# Patient Record
Sex: MALE | Race: WHITE | Employment: UNEMPLOYED | URBAN - METROPOLITAN AREA
[De-identification: names, ages, dates, MRNs, and addresses within clinical notes are randomized per-mention and may not be internally consistent; named-entity substitution may affect disease eponyms.]

---

## 2019-06-20 ENCOUNTER — APPOINTMENT (OUTPATIENT)
Dept: GENERAL RADIOLOGY | Age: 49
DRG: 279 | End: 2019-06-20
Payer: COMMERCIAL

## 2019-06-20 ENCOUNTER — HOSPITAL ENCOUNTER (INPATIENT)
Age: 49
LOS: 6 days | Discharge: HOME | DRG: 279 | End: 2019-06-26
Attending: EMERGENCY MEDICINE | Admitting: INTERNAL MEDICINE
Payer: COMMERCIAL

## 2019-06-20 ENCOUNTER — APPOINTMENT (OUTPATIENT)
Dept: CT IMAGING | Age: 49
DRG: 279 | End: 2019-06-20
Payer: COMMERCIAL

## 2019-06-20 DIAGNOSIS — K72.00 ACUTE LIVER FAILURE WITHOUT HEPATIC COMA: Primary | ICD-10-CM

## 2019-06-20 PROBLEM — B19.9 ACUTE LIVER FAILURE DUE TO HEPATITIS VIRUS: Status: ACTIVE | Noted: 2019-06-20

## 2019-06-20 LAB
A/G RATIO: 1.2 (ref 1.1–2.2)
ALBUMIN SERPL-MCNC: 3.9 G/DL (ref 3.4–5)
ALP BLD-CCNC: 199 U/L (ref 40–129)
ALT SERPL-CCNC: 3739 U/L (ref 10–40)
AMMONIA: 61 UMOL/L (ref 16–60)
AMPHETAMINE SCREEN, URINE: NORMAL
ANION GAP SERPL CALCULATED.3IONS-SCNC: 11 MMOL/L (ref 3–16)
AST SERPL-CCNC: 3339 U/L (ref 15–37)
BARBITURATE SCREEN URINE: NORMAL
BASOPHILS ABSOLUTE: 0 K/UL (ref 0–0.2)
BASOPHILS RELATIVE PERCENT: 0 %
BENZODIAZEPINE SCREEN, URINE: NORMAL
BILIRUB SERPL-MCNC: 11.2 MG/DL (ref 0–1)
BILIRUBIN URINE: ABNORMAL
BLOOD, URINE: NEGATIVE
BUN BLDV-MCNC: 16 MG/DL (ref 7–20)
CALCIUM SERPL-MCNC: 9.4 MG/DL (ref 8.3–10.6)
CANNABINOID SCREEN URINE: NORMAL
CHLORIDE BLD-SCNC: 98 MMOL/L (ref 99–110)
CLARITY: CLEAR
CO2: 27 MMOL/L (ref 21–32)
COCAINE METABOLITE SCREEN URINE: NORMAL
COLOR: YELLOW
CREAT SERPL-MCNC: 0.6 MG/DL (ref 0.9–1.3)
EOSINOPHILS ABSOLUTE: 0.1 K/UL (ref 0–0.6)
EOSINOPHILS RELATIVE PERCENT: 1 %
ETHANOL: NORMAL MG/DL (ref 0–0.08)
GFR AFRICAN AMERICAN: >60
GFR NON-AFRICAN AMERICAN: >60
GLOBULIN: 3.2 G/DL
GLUCOSE BLD-MCNC: 108 MG/DL (ref 70–99)
GLUCOSE URINE: NEGATIVE MG/DL
HCT VFR BLD CALC: 46.6 % (ref 40.5–52.5)
HEMATOLOGY PATH CONSULT: YES
HEMOGLOBIN: 16.2 G/DL (ref 13.5–17.5)
INR BLD: 1.37 (ref 0.86–1.14)
KETONES, URINE: NEGATIVE MG/DL
LEUKOCYTE ESTERASE, URINE: NEGATIVE
LIPASE: 30 U/L (ref 13–60)
LYMPHOCYTES ABSOLUTE: 2.3 K/UL (ref 1–5.1)
LYMPHOCYTES RELATIVE PERCENT: 24 %
Lab: NORMAL
MAGNESIUM: 2.1 MG/DL (ref 1.8–2.4)
MCH RBC QN AUTO: 31.3 PG (ref 26–34)
MCHC RBC AUTO-ENTMCNC: 34.7 G/DL (ref 31–36)
MCV RBC AUTO: 90.1 FL (ref 80–100)
METHADONE SCREEN, URINE: NORMAL
MICROSCOPIC EXAMINATION: ABNORMAL
MONOCYTES ABSOLUTE: 1.6 K/UL (ref 0–1.3)
MONOCYTES RELATIVE PERCENT: 16 %
NEUTROPHILS ABSOLUTE: 5.7 K/UL (ref 1.7–7.7)
NEUTROPHILS RELATIVE PERCENT: 59 %
NITRITE, URINE: NEGATIVE
OPIATE SCREEN URINE: NORMAL
OXYCODONE URINE: NORMAL
PDW BLD-RTO: 15.7 % (ref 12.4–15.4)
PH UA: 6.5
PH UA: 6.5 (ref 5–8)
PHENCYCLIDINE SCREEN URINE: NORMAL
PLATELET # BLD: 437 K/UL (ref 135–450)
PLATELET SLIDE REVIEW: ADEQUATE
PMV BLD AUTO: 7.9 FL (ref 5–10.5)
POTASSIUM SERPL-SCNC: 4.3 MMOL/L (ref 3.5–5.1)
PROPOXYPHENE SCREEN: NORMAL
PROTEIN UA: NEGATIVE MG/DL
PROTHROMBIN TIME: 15.5 SEC (ref 9.8–13)
RBC # BLD: 5.18 M/UL (ref 4.2–5.9)
SLIDE REVIEW: ABNORMAL
SODIUM BLD-SCNC: 136 MMOL/L (ref 136–145)
SPECIFIC GRAVITY UA: <=1.005 (ref 1–1.03)
TOTAL PROTEIN: 7.1 G/DL (ref 6.4–8.2)
URINE TYPE: ABNORMAL
UROBILINOGEN, URINE: 0.2 E.U./DL
WBC # BLD: 9.7 K/UL (ref 4–11)

## 2019-06-20 PROCEDURE — 80053 COMPREHEN METABOLIC PANEL: CPT

## 2019-06-20 PROCEDURE — 1200000000 HC SEMI PRIVATE

## 2019-06-20 PROCEDURE — 6360000004 HC RX CONTRAST MEDICATION: Performed by: EMERGENCY MEDICINE

## 2019-06-20 PROCEDURE — 85610 PROTHROMBIN TIME: CPT

## 2019-06-20 PROCEDURE — G0480 DRUG TEST DEF 1-7 CLASSES: HCPCS

## 2019-06-20 PROCEDURE — 83735 ASSAY OF MAGNESIUM: CPT

## 2019-06-20 PROCEDURE — 82140 ASSAY OF AMMONIA: CPT

## 2019-06-20 PROCEDURE — 71045 X-RAY EXAM CHEST 1 VIEW: CPT

## 2019-06-20 PROCEDURE — 36415 COLL VENOUS BLD VENIPUNCTURE: CPT

## 2019-06-20 PROCEDURE — 80074 ACUTE HEPATITIS PANEL: CPT

## 2019-06-20 PROCEDURE — 80307 DRUG TEST PRSMV CHEM ANLYZR: CPT

## 2019-06-20 PROCEDURE — 87341 HEP B SURFACE AG NEUTRLZJ IA: CPT

## 2019-06-20 PROCEDURE — 2580000003 HC RX 258: Performed by: EMERGENCY MEDICINE

## 2019-06-20 PROCEDURE — 81003 URINALYSIS AUTO W/O SCOPE: CPT

## 2019-06-20 PROCEDURE — 83690 ASSAY OF LIPASE: CPT

## 2019-06-20 PROCEDURE — 99285 EMERGENCY DEPT VISIT HI MDM: CPT

## 2019-06-20 PROCEDURE — 74177 CT ABD & PELVIS W/CONTRAST: CPT

## 2019-06-20 PROCEDURE — 85025 COMPLETE CBC W/AUTO DIFF WBC: CPT

## 2019-06-20 RX ORDER — SODIUM CHLORIDE 9 MG/ML
INJECTION, SOLUTION INTRAVENOUS CONTINUOUS
Status: DISCONTINUED | OUTPATIENT
Start: 2019-06-21 | End: 2019-06-23

## 2019-06-20 RX ORDER — 0.9 % SODIUM CHLORIDE 0.9 %
1000 INTRAVENOUS SOLUTION INTRAVENOUS ONCE
Status: COMPLETED | OUTPATIENT
Start: 2019-06-20 | End: 2019-06-20

## 2019-06-20 RX ORDER — SODIUM CHLORIDE 0.9 % (FLUSH) 0.9 %
10 SYRINGE (ML) INJECTION PRN
Status: DISCONTINUED | OUTPATIENT
Start: 2019-06-20 | End: 2019-06-26 | Stop reason: HOSPADM

## 2019-06-20 RX ORDER — ONDANSETRON 2 MG/ML
4 INJECTION INTRAMUSCULAR; INTRAVENOUS EVERY 6 HOURS PRN
Status: DISCONTINUED | OUTPATIENT
Start: 2019-06-20 | End: 2019-06-26 | Stop reason: HOSPADM

## 2019-06-20 RX ORDER — IBUPROFEN 400 MG/1
400 TABLET ORAL EVERY 6 HOURS PRN
Status: DISCONTINUED | OUTPATIENT
Start: 2019-06-20 | End: 2019-06-21

## 2019-06-20 RX ORDER — SODIUM CHLORIDE 0.9 % (FLUSH) 0.9 %
10 SYRINGE (ML) INJECTION EVERY 12 HOURS SCHEDULED
Status: DISCONTINUED | OUTPATIENT
Start: 2019-06-21 | End: 2019-06-23

## 2019-06-20 RX ADMIN — IOPAMIDOL 75 ML: 755 INJECTION, SOLUTION INTRAVENOUS at 20:41

## 2019-06-20 RX ADMIN — SODIUM CHLORIDE 1000 ML: 900 INJECTION, SOLUTION INTRAVENOUS at 20:10

## 2019-06-20 ASSESSMENT — PAIN DESCRIPTION - ORIENTATION: ORIENTATION: MID;LOWER

## 2019-06-20 ASSESSMENT — PAIN DESCRIPTION - FREQUENCY: FREQUENCY: CONTINUOUS

## 2019-06-20 ASSESSMENT — PAIN DESCRIPTION - PAIN TYPE: TYPE: ACUTE PAIN

## 2019-06-20 ASSESSMENT — PAIN DESCRIPTION - DESCRIPTORS: DESCRIPTORS: SHARP

## 2019-06-20 ASSESSMENT — PAIN DESCRIPTION - LOCATION: LOCATION: ABDOMEN

## 2019-06-20 ASSESSMENT — PAIN SCALES - GENERAL: PAINLEVEL_OUTOF10: 8

## 2019-06-20 NOTE — ED PROVIDER NOTES
Spouse name: Not on file    Number of children: Not on file    Years of education: Not on file    Highest education level: Not on file   Occupational History    Not on file   Social Needs    Financial resource strain: Not on file    Food insecurity:     Worry: Not on file     Inability: Not on file    Transportation needs:     Medical: Not on file     Non-medical: Not on file   Tobacco Use    Smoking status: Current Every Day Smoker     Packs/day: 0.50     Types: Cigarettes    Smokeless tobacco: Never Used    Tobacco comment: pt states he cant smoke when his throat feels like this. Substance and Sexual Activity    Alcohol use: Yes     Comment: occ    Drug use: Yes     Types: Marijuana, Methamphetamines     Comment: states he last used \"a couple of months ago\"    Sexual activity: Yes     Partners: Female   Lifestyle    Physical activity:     Days per week: Not on file     Minutes per session: Not on file    Stress: Not on file   Relationships    Social connections:     Talks on phone: Not on file     Gets together: Not on file     Attends Restorationism service: Not on file     Active member of club or organization: Not on file     Attends meetings of clubs or organizations: Not on file     Relationship status: Not on file    Intimate partner violence:     Fear of current or ex partner: Not on file     Emotionally abused: Not on file     Physically abused: Not on file     Forced sexual activity: Not on file   Other Topics Concern    Not on file   Social History Narrative    Not on file       Nursing notes reviewed. ED Triage Vitals [06/20/19 1942]   Enc Vitals Group      BP (!) 134/117      Pulse 80      Resp 16      Temp 97.8 °F (36.6 °C)      Temp Source Oral      SpO2 95 %      Weight 200 lb (90.7 kg)      Height 5' 8\" (1.727 m)      Head Circumference       Peak Flow       Pain Score       Pain Loc       Pain Edu? Excl. in [address]? GENERAL:  Awake, alert.  Well developed, well nourished with no apparent distress. HENT:  Normocephalic, Atraumatic, moist mucous membranes. EYES:  Pupils equal round and reactive to light, Conjunctiva normal, extraocular movements normal. +Scleral icterus  NECK:  No meningeal signs, Supple. CHEST:  Regular rate and rhythm, chest wall non-tender. LUNGS:  Clear to auscultation bilaterally, no respiratory distress. ABDOMEN:  Soft, Tender to palpation in the left lower quadrant of the abdomen no rebound tenderness or guarding, non-distended, normal bowel sounds. No costovertebral angle tenderness to palpation. EXTREMITIES:  Normal range of motion, no edema, no tenderness, no deformity, distal pulses present. BACK:  No tenderness. SKIN: Warm, dry and intact. + Jaundice  NEUROLOGIC: Normal mental status. Moving all extremities to command. RADIOLOGY  X-RAYS:  I have reviewed radiologic plain film image(s). ALL OTHER NON-PLAIN FILM IMAGES SUCH AS CT, ULTRASOUND AND MRI HAVE BEEN READ BY THE RADIOLOGIST. CT ABDOMEN PELVIS W IV CONTRAST Additional Contrast? None   Final Result   Mottled appearance of the hepatic parenchyma, with periportal and   pericholecystic edema, along with edema extending into the nicholas hepatis. These are nonspecific findings, but given the patient's history of jaundice,   hepatitis is the primary consideration. Mild mural thickening of the 2nd portion the duodenum, which is likely   reactive. Increased number of shotty borderline enlarged retroperitoneal lymph nodes,   also probably reactive, but follow-up to resolution is recommended. Indeterminate low-density lesion within the posterior right kidney. After   resolution of acute symptoms, dedicated renal CT or MRI is recommended to   better evaluate that. XR CHEST PORTABLE   Final Result   No acute process.               LABS  Results for orders placed or performed during the hospital encounter of 06/20/19   CBC auto differential   Result Value Ref Range    WBC 9.7 4.0 - 11.0 K/uL    RBC 5.18 4.20 - 5.90 M/uL    Hemoglobin 16.2 13.5 - 17.5 g/dL    Hematocrit 46.6 40.5 - 52.5 %    MCV 90.1 80.0 - 100.0 fL    MCH 31.3 26.0 - 34.0 pg    MCHC 34.7 31.0 - 36.0 g/dL    RDW 15.7 (H) 12.4 - 15.4 %    Platelets 552 000 - 532 K/uL    MPV 7.9 5.0 - 10.5 fL    PLATELET SLIDE REVIEW Adequate     SLIDE REVIEW see below     Path Consult Yes     Neutrophils % 59.0 %    Lymphocytes % 24.0 %    Monocytes % 16.0 %    Eosinophils % 1.0 %    Basophils % 0.0 %    Neutrophils # 5.7 1.7 - 7.7 K/uL    Lymphocytes # 2.3 1.0 - 5.1 K/uL    Monocytes # 1.6 (H) 0.0 - 1.3 K/uL    Eosinophils # 0.1 0.0 - 0.6 K/uL    Basophils # 0.0 0.0 - 0.2 K/uL   Comprehensive Metabolic Panel   Result Value Ref Range    Sodium 136 136 - 145 mmol/L    Potassium 4.3 3.5 - 5.1 mmol/L    Chloride 98 (L) 99 - 110 mmol/L    CO2 27 21 - 32 mmol/L    Anion Gap 11 3 - 16    Glucose 108 (H) 70 - 99 mg/dL    BUN 16 7 - 20 mg/dL    CREATININE 0.6 (L) 0.9 - 1.3 mg/dL    GFR Non-African American >60 >60    GFR African American >60 >60    Calcium 9.4 8.3 - 10.6 mg/dL    Total Protein 7.1 6.4 - 8.2 g/dL    Alb 3.9 3.4 - 5.0 g/dL    Albumin/Globulin Ratio 1.2 1.1 - 2.2    Total Bilirubin 11.2 (H) 0.0 - 1.0 mg/dL    Alkaline Phosphatase 199 (H) 40 - 129 U/L    ALT 3,739 (H) 10 - 40 U/L    AST 3,339 (H) 15 - 37 U/L    Globulin 3.2 g/dL   Lipase   Result Value Ref Range    Lipase 30.0 13.0 - 60.0 U/L   Magnesium   Result Value Ref Range    Magnesium 2.10 1.80 - 2.40 mg/dL   Ethanol   Result Value Ref Range    Ethanol Lvl None Detected mg/dL   Ammonia   Result Value Ref Range    Ammonia 61 (H) 16 - 60 umol/L   Protime-INR   Result Value Ref Range    Protime 15.5 (H) 9.8 - 13.0 sec    INR 1.37 (H) 0.86 - 1.14         PROCEDURES      MEDICAL DECISION MAKING  On arrival to the emergency department, patient afebrile with otherwise normal vital signs.   No neurological deficits at present physical exam.  Patient with new jaundice and scleral icterus as well as abdominal pain in the left lower quadrant. CBC, CMP obtained demonstrating no leukocytosis but does demonstrate new transaminitis with AST and ALT above 3000. INR 1.37. EtOH level negative. Ammonia level 61. Lipase and magnesium level normal.  Chest x-ray obtained demonstrating no concerning acute cardiopulmonary abnormalities or opacifications concerning for pneumonia. CT scan of the abdomen pelvis obtained demonstrating moderate appearance of the hepatic parenchyma and periportal and pericholecystic edema which is likely secondary to hepatitis/new onset liver dysfunction. Mural thickening of the second portion of the duodenum which is likely reactive as well as enlarged retroperitoneal lymph nodes which are also likely reactive in nature. Patient also with a low-density lesion within the posterior right kidney. Patient will be admitted to the hospitalist service for further evaluation of current symptoms. Hepatitis panel pending. Patient is remained otherwise hemodynamically stable. Final diagnoses:   Acute liver failure without hepatic coma         Patient was given scripts for the following medications. I counseled patient how to take these medications. New Prescriptions    No medications on file       Disposition  Pt is in stable condition upon Admit to med/surg floor. This chart was generated using the [address] [place] [address] dictation system. I created this record but it may contain dictation errors. Mariel Crane MD  06/20/19 2857      Addendum, discussed case with Dr. Bebeto Hoover, gastroenterologist, who is amendable to keep patient in [facility] for further evaluation as opposed to transfer to another facility or liver center. Patient will be admitted to [facility] as previously planned.      Mariel Crane MD  06/20/19 6261

## 2019-06-21 PROBLEM — B16.9 ACUTE HEPATITIS B: Status: ACTIVE | Noted: 2019-06-21

## 2019-06-21 PROBLEM — F41.9 ANXIETY DISORDER: Status: ACTIVE | Noted: 2019-06-21

## 2019-06-21 PROBLEM — Z59.00 HOMELESS: Status: ACTIVE | Noted: 2019-06-21

## 2019-06-21 PROBLEM — F15.10 METHAMPHETAMINE ABUSE (HCC): Status: ACTIVE | Noted: 2019-06-21

## 2019-06-21 LAB
A/G RATIO: 1.1 (ref 1.1–2.2)
ALBUMIN SERPL-MCNC: 3.3 G/DL (ref 3.4–5)
ALP BLD-CCNC: 181 U/L (ref 40–129)
ALT SERPL-CCNC: 3444 U/L (ref 10–40)
ANION GAP SERPL CALCULATED.3IONS-SCNC: 10 MMOL/L (ref 3–16)
AST SERPL-CCNC: 2790 U/L (ref 15–37)
BASOPHILS ABSOLUTE: 0.1 K/UL (ref 0–0.2)
BASOPHILS RELATIVE PERCENT: 1.3 %
BILIRUB SERPL-MCNC: 9.5 MG/DL (ref 0–1)
BUN BLDV-MCNC: 13 MG/DL (ref 7–20)
CALCIUM SERPL-MCNC: 8.6 MG/DL (ref 8.3–10.6)
CHLORIDE BLD-SCNC: 97 MMOL/L (ref 99–110)
CO2: 23 MMOL/L (ref 21–32)
CREAT SERPL-MCNC: <0.5 MG/DL (ref 0.9–1.3)
EOSINOPHILS ABSOLUTE: 0.2 K/UL (ref 0–0.6)
EOSINOPHILS RELATIVE PERCENT: 2.5 %
GFR AFRICAN AMERICAN: >60
GFR NON-AFRICAN AMERICAN: >60
GLOBULIN: 3 G/DL
GLUCOSE BLD-MCNC: 116 MG/DL (ref 70–99)
HAV IGM SER IA-ACNC: ABNORMAL
HCT VFR BLD CALC: 42.6 % (ref 40.5–52.5)
HEMATOLOGY PATH CONSULT: NORMAL
HEMOGLOBIN: 14.7 G/DL (ref 13.5–17.5)
HEPATITIS B CORE IGM ANTIBODY: REACTIVE
HEPATITIS B SURFACE ANTIGEN INTERPRETATION: REACTIVE
HEPATITIS C ANTIBODY INTERPRETATION: ABNORMAL
INR BLD: 1.42 (ref 0.86–1.14)
LYMPHOCYTES ABSOLUTE: 2.3 K/UL (ref 1–5.1)
LYMPHOCYTES RELATIVE PERCENT: 24.9 %
MCH RBC QN AUTO: 31.6 PG (ref 26–34)
MCHC RBC AUTO-ENTMCNC: 34.6 G/DL (ref 31–36)
MCV RBC AUTO: 91.3 FL (ref 80–100)
MONOCYTES ABSOLUTE: 1.1 K/UL (ref 0–1.3)
MONOCYTES RELATIVE PERCENT: 12 %
NEUTROPHILS ABSOLUTE: 5.4 K/UL (ref 1.7–7.7)
NEUTROPHILS RELATIVE PERCENT: 59.3 %
PDW BLD-RTO: 16.4 % (ref 12.4–15.4)
PLATELET # BLD: 374 K/UL (ref 135–450)
PMV BLD AUTO: 9 FL (ref 5–10.5)
POTASSIUM REFLEX MAGNESIUM: 3.9 MMOL/L (ref 3.5–5.1)
PROTHROMBIN TIME: 16.2 SEC (ref 9.8–13)
RBC # BLD: 4.66 M/UL (ref 4.2–5.9)
SODIUM BLD-SCNC: 130 MMOL/L (ref 136–145)
TOTAL PROTEIN: 6.3 G/DL (ref 6.4–8.2)
WBC # BLD: 9.1 K/UL (ref 4–11)

## 2019-06-21 PROCEDURE — 36415 COLL VENOUS BLD VENIPUNCTURE: CPT

## 2019-06-21 PROCEDURE — 6360000002 HC RX W HCPCS: Performed by: INTERNAL MEDICINE

## 2019-06-21 PROCEDURE — 99233 SBSQ HOSP IP/OBS HIGH 50: CPT | Performed by: INTERNAL MEDICINE

## 2019-06-21 PROCEDURE — 85610 PROTHROMBIN TIME: CPT

## 2019-06-21 PROCEDURE — 1200000000 HC SEMI PRIVATE

## 2019-06-21 PROCEDURE — 6370000000 HC RX 637 (ALT 250 FOR IP): Performed by: INTERNAL MEDICINE

## 2019-06-21 PROCEDURE — 85025 COMPLETE CBC W/AUTO DIFF WBC: CPT

## 2019-06-21 PROCEDURE — 80053 COMPREHEN METABOLIC PANEL: CPT

## 2019-06-21 PROCEDURE — 6370000000 HC RX 637 (ALT 250 FOR IP): Performed by: PHYSICIAN ASSISTANT

## 2019-06-21 PROCEDURE — 2580000003 HC RX 258: Performed by: INTERNAL MEDICINE

## 2019-06-21 PROCEDURE — 94761 N-INVAS EAR/PLS OXIMETRY MLT: CPT

## 2019-06-21 RX ORDER — LORAZEPAM 0.5 MG/1
0.5 TABLET ORAL ONCE
Status: COMPLETED | OUTPATIENT
Start: 2019-06-21 | End: 2019-06-21

## 2019-06-21 RX ORDER — NICOTINE 21 MG/24HR
1 PATCH, TRANSDERMAL 24 HOURS TRANSDERMAL DAILY
Status: DISCONTINUED | OUTPATIENT
Start: 2019-06-21 | End: 2019-06-21

## 2019-06-21 RX ORDER — ZOLPIDEM TARTRATE 5 MG/1
5 TABLET ORAL NIGHTLY PRN
Status: DISCONTINUED | OUTPATIENT
Start: 2019-06-21 | End: 2019-06-26 | Stop reason: HOSPADM

## 2019-06-21 RX ORDER — PHYTONADIONE 10 MG/ML
10 INJECTION, EMULSION INTRAMUSCULAR; INTRAVENOUS; SUBCUTANEOUS ONCE
Status: COMPLETED | OUTPATIENT
Start: 2019-06-21 | End: 2019-06-21

## 2019-06-21 RX ORDER — OXYCODONE HYDROCHLORIDE 5 MG/1
5 TABLET ORAL EVERY 6 HOURS PRN
Status: DISCONTINUED | OUTPATIENT
Start: 2019-06-21 | End: 2019-06-26 | Stop reason: HOSPADM

## 2019-06-21 RX ADMIN — SODIUM CHLORIDE: 9 INJECTION, SOLUTION INTRAVENOUS at 01:31

## 2019-06-21 RX ADMIN — Medication 10 ML: at 22:23

## 2019-06-21 RX ADMIN — NICOTINE POLACRILEX 4 MG: 4 GUM, CHEWING BUCCAL at 10:16

## 2019-06-21 RX ADMIN — SODIUM CHLORIDE: 9 INJECTION, SOLUTION INTRAVENOUS at 22:23

## 2019-06-21 RX ADMIN — OXYCODONE HYDROCHLORIDE 5 MG: 5 TABLET ORAL at 13:41

## 2019-06-21 RX ADMIN — PHYTONADIONE 10 MG: 10 INJECTION, EMULSION INTRAMUSCULAR; INTRAVENOUS; SUBCUTANEOUS at 15:26

## 2019-06-21 RX ADMIN — NICOTINE POLACRILEX 4 MG: 4 GUM, CHEWING BUCCAL at 22:29

## 2019-06-21 RX ADMIN — OXYCODONE HYDROCHLORIDE 5 MG: 5 TABLET ORAL at 22:29

## 2019-06-21 RX ADMIN — LORAZEPAM 0.5 MG: 0.5 TABLET ORAL at 02:37

## 2019-06-21 SDOH — HEALTH STABILITY: MENTAL HEALTH: HOW OFTEN DO YOU HAVE A DRINK CONTAINING ALCOHOL?: NEVER

## 2019-06-21 ASSESSMENT — PAIN DESCRIPTION - DESCRIPTORS: DESCRIPTORS: CONSTANT;SHARP

## 2019-06-21 ASSESSMENT — PAIN SCALES - GENERAL
PAINLEVEL_OUTOF10: 7
PAINLEVEL_OUTOF10: 5
PAINLEVEL_OUTOF10: 7

## 2019-06-21 ASSESSMENT — PAIN DESCRIPTION - ORIENTATION: ORIENTATION: MID

## 2019-06-21 ASSESSMENT — PAIN - FUNCTIONAL ASSESSMENT: PAIN_FUNCTIONAL_ASSESSMENT: PREVENTS OR INTERFERES SOME ACTIVE ACTIVITIES AND ADLS

## 2019-06-21 ASSESSMENT — PAIN DESCRIPTION - PAIN TYPE: TYPE: ACUTE PAIN

## 2019-06-21 ASSESSMENT — PAIN DESCRIPTION - LOCATION: LOCATION: ABDOMEN

## 2019-06-21 NOTE — PLAN OF CARE
Problem: Infection:  Goal: Will remain free from infection  Description  Will remain free from infection  6/21/2019 1305 by Brandon Dupont RN  Outcome: Ongoing  6/21/2019 0221 by Saturnino Li RN  Outcome: Ongoing     Problem: Safety:  Goal: Free from accidental physical injury  Description  Free from accidental physical injury  Outcome: Ongoing  Goal: Free from intentional harm  Description  Free from intentional harm  Outcome: Ongoing     Problem: Daily Care:  Goal: Daily care needs are met  Description  Daily care needs are met  6/21/2019 1305 by Brandon Dupont RN  Outcome: Ongoing  6/21/2019 0221 by Saturnino Li RN  Outcome: Ongoing     Problem: Pain:  Goal: Patient's pain/discomfort is manageable  Description  Patient's pain/discomfort is manageable  6/21/2019 1305 by Brandon Dupont RN  Outcome: Ongoing  6/21/2019 0221 by Saturnino Li RN  Outcome: Ongoing     Problem: Skin Integrity:  Goal: Skin integrity will stabilize  Description  Skin integrity will stabilize  Outcome: Ongoing     Problem: Discharge Planning:  Goal: Patients continuum of care needs are met  Description  Patients continuum of care needs are met  Outcome: Ongoing

## 2019-06-21 NOTE — CONSULTS
Gastroenterology Consult Note    Patient:   Elyse Helton   :    1970   Facility:   [facility]  Referring/PCP: No primary care provider on file. Date:     2019  Consultant:   Lori Martin DO    Subjective: This 50 y.o. male was admitted 2019 with a diagnosis of \"Acute liver failure due to hepatitis virus [K72.00, B19.9]  Acute liver failure due to hepatitis virus [K72.00, B19.9]  Acute liver failure due to hepatitis virus [K72.00, B19.9]\" and is seen in consultation regarding acute hepatitis b    51 yo homeless male with history of methamphetamine abuse who presented with acute hepatitis. Transaminases 3339/3739 respectively with some improvement. No confusion. No known history of liver disease. INR 1.37->1. 42. Mild abdominal pain. Hep   B Sag and Core Ab IgM was positive. Past Medical History:   Diagnosis Date    Acute hepatitis B 2019    Anxiety     Asthma     Homeless 2019    Hypertension     Methamphetamine abuse ([facility] [address]      History reviewed. No pertinent surgical history. Social:   Social History     Tobacco Use    Smoking status: Current Every Day Smoker     Packs/day: 0.50     Types: Cigarettes    Smokeless tobacco: Never Used    Tobacco comment: pt states he cant smoke when his throat feels like this. Substance Use Topics    Alcohol use: Never     Frequency: Never     Comment: occ     Family: History reviewed. No pertinent family history. Scheduled Medications:    sodium chloride flush  10 mL Intravenous 2 times per day     Infusions:    sodium chloride 75 mL/hr at 19 0131     PRN Medications: nicotine polacrilex, zolpidem, oxyCODONE, sodium chloride flush, magnesium hydroxide, ondansetron, ibuprofen  Allergies:    Allergies   Allergen Reactions    Pcn [Penicillins] Hives       ROS: Constitutional: negative for chills, fevers and sweats  Eyes: negative for cataracts, icterus and redness  Ears, nose, mouth, throat, and
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
## 2019-06-21 NOTE — PLAN OF CARE
Problem: Infection:  Goal: Will remain free from infection  Description  Will remain free from infection  Outcome: Ongoing     Problem: Daily Care:  Goal: Daily care needs are met  Description  Daily care needs are met  Outcome: Ongoing     Problem: Pain:  Goal: Patient's pain/discomfort is manageable  Description  Patient's pain/discomfort is manageable  Outcome: Ongoing

## 2019-06-21 NOTE — H&P
Gastrointestinal:  Negative for nausea, vomiting, diarrhea  Genitourinary:   abdominal pain,  Hematologic/Lymphatic:  Negative for  bleeding tendency, easy bruising  Musculoskeletal:  Negative for myalgias and arthralgias  Neurologic:  Negative for  confusion,dysarthria. Skin:   positive jaundice  Psychiatric:  Negative for depression,anxiety, agitation. Endocrine:  Negative for polydipsia,polyuria,heat /cold intolerance. PHYSICAL EXAM:    /88   Pulse 64   Temp 97.8 °F (36.6 °C) (Oral)   Resp 16   Ht 5' 8\" (1.727 m)   Wt 200 lb (90.7 kg)   SpO2 99%   BMI 30.41 kg/m²     General appearance:  No apparent distress, appears stated age and cooperative. HEENT:  Scleral icterus otherwise NCAT  Neck: Supple, with full range of motion. No jugular venous distention. Trachea midline. Respiratory:  Normal respiratory effort. Clear to auscultation, bilaterally without Rales/Wheezes/Rhonchi. Cardiovascular:  Regular rate and rhythm with normal S1/S2 without murmurs, rubs or gallops. Abdomen: Tender to palpation left upper quadrant  Musculoskeletal:  No clubbing, cyanosis or edema bilaterally. Full range of motion without deformity. Skin: Jaundice  Neurologic:  Neurovascularly intact without any focal sensory/motor deficits.  Cranial nerves: II-XII intact, grossly non-focal.  Psychiatric:  Alert and oriented, thought content appropriate, normal insight  Capillary Refill: Brisk,< 3 seconds   Peripheral Pulses: +2 palpable, equal bilaterally       Labs:   Recent Labs     06/20/19 1945   WBC 9.7   HGB 16.2   HCT 46.6        Recent Labs     06/20/19 1945      K 4.3   CL 98*   CO2 27   BUN 16   CREATININE 0.6*   CALCIUM 9.4     Recent Labs     06/20/19 1945   AST 3,339*   ALT 3,739*   BILITOT 11.2*   ALKPHOS 199*     Recent Labs     06/20/19 1945   INR 1.37*     Urinalysis:      Lab Results   Component Value Date    NITRU Negative 06/20/2019    BLOODU Negative 06/20/2019    SPECGRAV <=1.005 06/20/2019    GLUCOSEU Negative 06/20/2019       Radiology:     CT ABDOMEN PELVIS W IV CONTRAST Additional Contrast? None   Final Result   Mottled appearance of the hepatic parenchyma, with periportal and   pericholecystic edema, along with edema extending into the nicholas hepatis. These are nonspecific findings, but given the patient's history of jaundice,   hepatitis is the primary consideration. Mild mural thickening of the 2nd portion the duodenum, which is likely   reactive. Increased number of shotty borderline enlarged retroperitoneal lymph nodes,   also probably reactive, but follow-up to resolution is recommended. Indeterminate low-density lesion within the posterior right kidney. After   resolution of acute symptoms, dedicated renal CT or MRI is recommended to   better evaluate that. XR CHEST PORTABLE   Final Result   No acute process. ASSESSMENT:  Acute liver failure, likely acute hepatitis   Coagulopathy secondary to above  Methamphetamine abuse  Tobacco abuse  Anxiety disorder   Homeless    PLAN:  Supportive care   Gentle IV fluids  Avoid hepatotoxic medications   Acute hepatitis panel ordered   GI consulted in the ED  Follow labs and coags   Nicotine patch     DVT Prophylaxis: Coagulopathic   Diet: DIET LOW SODIUM 2 GM;  Code Status: Full Code    Dispo - Admit. Thank you for the opportunity to be involved in this patient's care.       (Please note that portions of this note were completed with a voice recognition program. Efforts were made to edit the dictations but occasionally words are mis-transcribed.)

## 2019-06-21 NOTE — PROGRESS NOTES
Progress Note    Admit Date:  6/20/2019  Admitted for acute liver failure     Subjective:  Mr. Yoel Estevez is feeling very fatigued and still having abdominal pain but no N/V. Reports he has been living in the woods without any resources and report no recent food or water intake. Reports he hasn't been able to sleep     Objective:   Patient Vitals for the past 4 hrs:   BP Temp Temp src Pulse Resp SpO2   06/21/19 0926 132/80 97.7 °F (36.5 °C) Oral 58 16 99 %            Intake/Output Summary (Last 24 hours) at 6/21/2019 0957  Last data filed at 6/21/2019 0411  Gross per 24 hour   Intake --   Output 350 ml   Net -350 ml       Physical Exam:  Gen: No distress. Alert. Eyes: PERRL. + sclera icterus. No conjunctival injection. ENT: No discharge. Pharynx clear. Neck: No JVD. Trachea midline. Resp: No accessory muscle use. No crackles. + wheezes. No rhonchi. CV: Regular rate. Regular rhythm. No murmur. No rub. No edema. Capillary Refill: Brisk,< 3 seconds   Peripheral Pulses: +2 palpable, equal bilaterally   GI: Diffuse TTP. Non-distended. Normal bowel sounds. Skin: Warm and dry. Jaundice  M/S: No cyanosis. No joint deformity. No clubbing. Neuro: Awake. Grossly nonfocal    Psych: Oriented x 3. + anxiety, No agitation.        Scheduled Meds:   sodium chloride flush  10 mL Intravenous 2 times per day       Continuous Infusions:   sodium chloride 75 mL/hr at 06/21/19 0131       PRN Meds:  nicotine polacrilex, sodium chloride flush, magnesium hydroxide, ondansetron, ibuprofen      Data:  CBC:   Recent Labs     06/20/19 1945 06/21/19 0520   WBC 9.7 9.1   HGB 16.2 14.7   HCT 46.6 42.6   MCV 90.1 91.3    374     BMP:   Recent Labs     06/20/19 1945 06/21/19 0519    130*   K 4.3 3.9   CL 98* 97*   CO2 27 23   BUN 16 13   CREATININE 0.6* <0.5*     LIVER PROFILE:   Recent Labs     06/20/19 1945 06/21/19 0519   AST 3,339* 2,790*   ALT 3,739* 3,444*   LIPASE 30.0  --    BILITOT 11.2* 9.5*   ALKPHOS 199* 181*     PT/INR:   Recent Labs     06/20/19 1945 06/21/19  0520   PROTIME 15.5* 16.2*   INR 1.37* 1.42*     Magnesium 2.10      Ethanol Lvl None Detected      Ammonia 61High      UDS: negative     Path Consult 06/20/2019  7:45 PM  - [facility]   Reviewed    Comment:   Peripheral blood smear and histogram are reviewed and show an absolute   monocytosis.  These findings may be related to recovering infection (TB,   toxoplasmosis, endocarditis or sepsis), tissue injury, drugs or   chemotherapy. If no etiology is identified and/or the findings persist,   consider hematology consult.  CPT code: 02499. CULTURES  None     RADIOLOGY  CT ABDOMEN PELVIS W IV CONTRAST Additional Contrast? None   Final Result   Mottled appearance of the hepatic parenchyma, with periportal and   pericholecystic edema, along with edema extending into the nicholas hepatis. These are nonspecific findings, but given the patient's history of jaundice,   hepatitis is the primary consideration. Mild mural thickening of the 2nd portion the duodenum, which is likely   reactive. Increased number of shotty borderline enlarged retroperitoneal lymph nodes,   also probably reactive, but follow-up to resolution is recommended. Indeterminate low-density lesion within the posterior right kidney. After   resolution of acute symptoms, dedicated renal CT or MRI is recommended to   better evaluate that. XR CHEST PORTABLE   Final Result   No acute process. Armida Kirk have reviewed the chart on [facility] and personally interviewed and examined patient, reviewed the data (labs and imaging) and after discussion with my PA formulated the plan. Agree with note with the following edits. HPI:     He was admitted for abdominal pain and N/V. He has fatigue. He is homeless. I reviewed the patient's Past Medical History, Past Surgical History, Medications, and Allergies.      Physical exam:    /80 Pulse 58   Temp 97.7 °F (36.5 °C) (Oral)   Resp 16   Ht 5' 8\" (1.727 m)   Wt 200 lb (90.7 kg)   SpO2 99%   BMI 30.41 kg/m²     Gen: No distress. Alert. Eyes: PERRL. + sclera icterus. No conjunctival injection. ENT: No discharge. Pharynx clear. Neck: Trachea midline. Normal thyroid. Resp: No accessory muscle use. No crackles. No wheezes. No rhonchi. No dullness on percussion. CV: Regular rate. Regular rhythm. No murmur or rub. No edema. GI: Non-tender. Non-distended. No masses. No organomegaly. Normal bowel sounds. No hernia. Assessment/Plan:  Acute liver failure  - likely acute hepatitis--hep panel + Hep B Ig M.  Unsure how he got it.  - IVF   - GI consult   - supportive care     Coagulopathy  - secondary to above  - continue to monitor     Methamphetamine abuse  - Reports use 3x this year   - Recommend cessation     Tobacco Abuse   - Recommend cessation   - PRN Nicotine patch/gum     Anxiety disorder   Insomnia   - Was given Ativan in the ED   - Takes no medications at home   - Will trial Ambien tonight       Homeless  -  consult     R renal lesion   - Noted on CT   - Will need dedicated films after resolution of liver failure     DVT Prophylaxis: SCD  Diet: DIET LOW SODIUM 2 GM;  Code Status: Full Code    Ivania Jacome 9:57 AM 6/21/2019    TRACEY Sher 6/21/2019 11:32 AM

## 2019-06-21 NOTE — PROGRESS NOTES
Bedside report and pt care transferred to [facility]. Rick Centeno RN

## 2019-06-22 LAB
ALBUMIN SERPL-MCNC: 3.1 G/DL (ref 3.4–5)
ALP BLD-CCNC: 174 U/L (ref 40–129)
ALT SERPL-CCNC: 3707 U/L (ref 10–40)
ANION GAP SERPL CALCULATED.3IONS-SCNC: 10 MMOL/L (ref 3–16)
AST SERPL-CCNC: 3155 U/L (ref 15–37)
BILIRUB SERPL-MCNC: 10.4 MG/DL (ref 0–1)
BILIRUBIN DIRECT: 7.3 MG/DL (ref 0–0.3)
BILIRUBIN, INDIRECT: 3.1 MG/DL (ref 0–1)
BUN BLDV-MCNC: 8 MG/DL (ref 7–20)
CALCIUM SERPL-MCNC: 8.4 MG/DL (ref 8.3–10.6)
CHLORIDE BLD-SCNC: 100 MMOL/L (ref 99–110)
CO2: 24 MMOL/L (ref 21–32)
CREAT SERPL-MCNC: <0.5 MG/DL (ref 0.9–1.3)
GFR AFRICAN AMERICAN: >60
GFR NON-AFRICAN AMERICAN: >60
GLUCOSE BLD-MCNC: 149 MG/DL (ref 70–99)
HEPATITIS B SURFACE ANTIGEN CONFIRMATION: POSITIVE
POTASSIUM SERPL-SCNC: 4.2 MMOL/L (ref 3.5–5.1)
SODIUM BLD-SCNC: 134 MMOL/L (ref 136–145)
TOTAL PROTEIN: 5.9 G/DL (ref 6.4–8.2)

## 2019-06-22 PROCEDURE — 80048 BASIC METABOLIC PNL TOTAL CA: CPT

## 2019-06-22 PROCEDURE — 1200000000 HC SEMI PRIVATE

## 2019-06-22 PROCEDURE — 36415 COLL VENOUS BLD VENIPUNCTURE: CPT

## 2019-06-22 PROCEDURE — 6370000000 HC RX 637 (ALT 250 FOR IP): Performed by: INTERNAL MEDICINE

## 2019-06-22 PROCEDURE — 94761 N-INVAS EAR/PLS OXIMETRY MLT: CPT

## 2019-06-22 PROCEDURE — 99232 SBSQ HOSP IP/OBS MODERATE 35: CPT | Performed by: INTERNAL MEDICINE

## 2019-06-22 PROCEDURE — 6370000000 HC RX 637 (ALT 250 FOR IP): Performed by: PHYSICIAN ASSISTANT

## 2019-06-22 PROCEDURE — 80076 HEPATIC FUNCTION PANEL: CPT

## 2019-06-22 PROCEDURE — 2580000003 HC RX 258: Performed by: INTERNAL MEDICINE

## 2019-06-22 RX ADMIN — SODIUM CHLORIDE: 9 INJECTION, SOLUTION INTRAVENOUS at 15:39

## 2019-06-22 RX ADMIN — OXYCODONE HYDROCHLORIDE 5 MG: 5 TABLET ORAL at 15:38

## 2019-06-22 RX ADMIN — OXYCODONE HYDROCHLORIDE 5 MG: 5 TABLET ORAL at 04:49

## 2019-06-22 RX ADMIN — NICOTINE POLACRILEX 4 MG: 4 GUM, CHEWING BUCCAL at 17:31

## 2019-06-22 ASSESSMENT — PAIN SCALES - GENERAL
PAINLEVEL_OUTOF10: 6
PAINLEVEL_OUTOF10: 7
PAINLEVEL_OUTOF10: 5

## 2019-06-22 ASSESSMENT — PAIN DESCRIPTION - PROGRESSION: CLINICAL_PROGRESSION: GRADUALLY IMPROVING

## 2019-06-22 ASSESSMENT — PAIN DESCRIPTION - PAIN TYPE: TYPE: ACUTE PAIN

## 2019-06-22 ASSESSMENT — PAIN DESCRIPTION - DESCRIPTORS: DESCRIPTORS: CONSTANT;SHARP

## 2019-06-22 ASSESSMENT — PAIN DESCRIPTION - ORIENTATION: ORIENTATION: MID

## 2019-06-22 ASSESSMENT — PAIN DESCRIPTION - LOCATION: LOCATION: ABDOMEN

## 2019-06-22 ASSESSMENT — PAIN - FUNCTIONAL ASSESSMENT: PAIN_FUNCTIONAL_ASSESSMENT: PREVENTS OR INTERFERES SOME ACTIVE ACTIVITIES AND ADLS

## 2019-06-22 NOTE — PROGRESS NOTES
Handoff report and transfer of care given at bedside to [address] [place] [address]. Patient in stable condition, denies needs/concerns at this time. Call light within reach.

## 2019-06-22 NOTE — PROGRESS NOTES
Pt requesting pain medication  7/10 in lower back/ abdomen. PRN  pain medication given, see MAR. SR up x2, Call light and bedside table in easy reach. Denies any other needs at this time.

## 2019-06-22 NOTE — PROGRESS NOTES
Am assessment completed. See flowsheet. Pt denies needs at this time. Call light within reach. Mehrdad Brooks RN\

## 2019-06-22 NOTE — PLAN OF CARE
Problem: Infection:  Goal: Will remain free from infection  Description  Will remain free from infection  6/22/2019 0218 by Celso Senior RN  Outcome: Ongoing  6/21/2019 1305 by Chadwick Copeland RN  Outcome: Ongoing     Problem: Safety:  Goal: Free from accidental physical injury  Description  Free from accidental physical injury  6/22/2019 0218 by Celso Senior RN  Outcome: Ongoing  6/21/2019 1305 by Chadwick Copeland RN  Outcome: Ongoing  Goal: Free from intentional harm  Description  Free from intentional harm  6/22/2019 0218 by Celso Senior RN  Outcome: Ongoing  6/21/2019 1305 by Chadwick Copeland RN  Outcome: Ongoing     Problem: Daily Care:  Goal: Daily care needs are met  Description  Daily care needs are met  6/22/2019 0218 by Celso Senior RN  Outcome: Ongoing  6/21/2019 1305 by Chadwick Copeland RN  Outcome: Ongoing     Problem: Pain:  Description  Pain management should include both nonpharmacologic and pharmacologic interventions.   Goal: Patient's pain/discomfort is manageable  Description  Patient's pain/discomfort is manageable  6/22/2019 0218 by Celso Senior RN  Outcome: Ongoing  6/21/2019 1305 by Chadwick Copeland RN  Outcome: Ongoing  Goal: Pain level will decrease  Description  Pain level will decrease  Outcome: Ongoing  Goal: Control of acute pain  Description  Control of acute pain  Outcome: Ongoing  Goal: Control of chronic pain  Description  Control of chronic pain  Outcome: Ongoing     Problem: Skin Integrity:  Goal: Skin integrity will stabilize  Description  Skin integrity will stabilize  6/22/2019 0218 by Celso Senior RN  Outcome: Ongoing  6/21/2019 1305 by Chadwick Copeland RN  Outcome: Ongoing     Problem: Discharge Planning:  Goal: Patients continuum of care needs are met  Description  Patients continuum of care needs are met  6/22/2019 0218 by Celso Senior RN  Outcome: Ongoing  6/21/2019 1305 by Chadwick Copeland RN  Outcome: Ongoing

## 2019-06-22 NOTE — PLAN OF CARE
Problem: Infection:  Goal: Will remain free from infection  Description  Will remain free from infection  6/22/2019 1130 by Manuel Shannon RN  Outcome: Ongoing  6/22/2019 0218 by Chelsea Mckee RN  Outcome: Ongoing     Problem: Safety:  Goal: Free from accidental physical injury  Description  Free from accidental physical injury  6/22/2019 1130 by Manuel Shannon RN  Outcome: Ongoing  6/22/2019 0218 by Chelsea Mckee RN  Outcome: Ongoing  Goal: Free from intentional harm  Description  Free from intentional harm  6/22/2019 1130 by Manuel Shannon RN  Outcome: Ongoing  6/22/2019 0218 by Chelsea Mckee RN  Outcome: Ongoing     Problem: Daily Care:  Goal: Daily care needs are met  Description  Daily care needs are met  6/22/2019 1130 by Manuel Shannon RN  Outcome: Ongoing  6/22/2019 0218 by Chelsea Mckee RN  Outcome: Ongoing     Problem: Pain:  Description  Pain management should include both nonpharmacologic and pharmacologic interventions.   Goal: Patient's pain/discomfort is manageable  Description  Patient's pain/discomfort is manageable  6/22/2019 1130 by Manuel Shannon RN  Outcome: Ongoing  6/22/2019 0218 by Chelsea Mckee RN  Outcome: Ongoing  Goal: Pain level will decrease  Description  Pain level will decrease  6/22/2019 1130 by Manuel Shannon RN  Outcome: Ongoing  6/22/2019 0218 by Chelsea Mckee RN  Outcome: Ongoing  Goal: Control of acute pain  Description  Control of acute pain  6/22/2019 1130 by Manuel Shannon RN  Outcome: Ongoing  6/22/2019 0218 by Chelsea Mckee RN  Outcome: Ongoing  Goal: Control of chronic pain  Description  Control of chronic pain  6/22/2019 1130 by Manuel Shannon RN  Outcome: Ongoing  6/22/2019 0218 by Chelsea Mckee RN  Outcome: Ongoing     Problem: Skin Integrity:  Goal: Skin integrity will stabilize  Description  Skin integrity will stabilize  6/22/2019 1130 by Manuel Shannon RN  Outcome: Ongoing  6/22/2019 0218 by Chelsea Mckee RN  Outcome: Ongoing     Problem: Discharge

## 2019-06-22 NOTE — PROGRESS NOTES
Shift assessment complete; see flow sheet. Scheduled medications administered; See MAR. IV infusing without difficulty. Pt denies any needs at this time. Call light within reach, bed in low locked position, bed alarm on.

## 2019-06-22 NOTE — PROGRESS NOTES
Detected      Ammonia 61High      UDS: negative     Path Consult 06/20/2019  7:45 PM [facility]   Reviewed    Comment:   Peripheral blood smear and histogram are reviewed and show an absolute   monocytosis.  These findings may be related to recovering infection (TB,   toxoplasmosis, endocarditis or sepsis), tissue injury, drugs or   chemotherapy. If no etiology is identified and/or the findings persist,   consider hematology consult.  CPT code: 58108. CULTURES  None     RADIOLOGY  CT ABDOMEN PELVIS W IV CONTRAST Additional Contrast? None   Final Result   Mottled appearance of the hepatic parenchyma, with periportal and   pericholecystic edema, along with edema extending into the nicholas hepatis. These are nonspecific findings, but given the patient's history of jaundice,   hepatitis is the primary consideration. Mild mural thickening of the 2nd portion the duodenum, which is likely   reactive. Increased number of shotty borderline enlarged retroperitoneal lymph nodes,   also probably reactive, but follow-up to resolution is recommended. Indeterminate low-density lesion within the posterior right kidney. After   resolution of acute symptoms, dedicated renal CT or MRI is recommended to   better evaluate that. XR CHEST PORTABLE   Final Result   No acute process. Assessment/Plan:  Acute liver failure improving.  - likely acute hepatitis--hep panel + Hep B Ig M. Unsure how he got it. Repeat labs. - IVF   - GI consulted  - supportive care     Coagulopathy  - secondary to above  - continue to monitor     Methamphetamine abuse  - Reports use 3x this year   - Recommend cessation   - he will go in patient rehab.     Tobacco Abuse   - Recommend cessation   - PRN Nicotine patch/gum     Anxiety disorder   Insomnia   - Was given Ativan in the ED   - Takes no medications at home   - Will trial Ambien tonight       Homeless  -  consult     R renal lesion   - Noted on CT   - Will need dedicated films after resolution of liver failure     DVT Prophylaxis: SCD  Diet: DIET LOW SODIUM 2 GM;  Code Status: Full Code    Cat Bynum 8:32 AM 6/22/2019

## 2019-06-23 PROCEDURE — 94761 N-INVAS EAR/PLS OXIMETRY MLT: CPT

## 2019-06-23 PROCEDURE — 1200000000 HC SEMI PRIVATE

## 2019-06-23 PROCEDURE — 6370000000 HC RX 637 (ALT 250 FOR IP): Performed by: INTERNAL MEDICINE

## 2019-06-23 PROCEDURE — 99231 SBSQ HOSP IP/OBS SF/LOW 25: CPT | Performed by: INTERNAL MEDICINE

## 2019-06-23 RX ADMIN — OXYCODONE HYDROCHLORIDE 5 MG: 5 TABLET ORAL at 14:02

## 2019-06-23 RX ADMIN — OXYCODONE HYDROCHLORIDE 5 MG: 5 TABLET ORAL at 00:15

## 2019-06-23 ASSESSMENT — PAIN DESCRIPTION - ORIENTATION: ORIENTATION: MID

## 2019-06-23 ASSESSMENT — PAIN SCALES - GENERAL
PAINLEVEL_OUTOF10: 0
PAINLEVEL_OUTOF10: 7
PAINLEVEL_OUTOF10: 7

## 2019-06-23 ASSESSMENT — PAIN - FUNCTIONAL ASSESSMENT: PAIN_FUNCTIONAL_ASSESSMENT: ACTIVITIES ARE NOT PREVENTED

## 2019-06-23 ASSESSMENT — PAIN DESCRIPTION - DESCRIPTORS: DESCRIPTORS: CONSTANT;SHARP

## 2019-06-23 ASSESSMENT — PAIN DESCRIPTION - LOCATION: LOCATION: ABDOMEN

## 2019-06-23 ASSESSMENT — PAIN DESCRIPTION - PAIN TYPE: TYPE: ACUTE PAIN

## 2019-06-23 NOTE — PLAN OF CARE
Problem: Infection:  Goal: Will remain free from infection  Description  Will remain free from infection  Outcome: Ongoing     Problem: Safety:  Goal: Free from accidental physical injury  Description  Free from accidental physical injury  Outcome: Ongoing  Goal: Free from intentional harm  Description  Free from intentional harm  Outcome: Ongoing     Problem: Daily Care:  Goal: Daily care needs are met  Description  Daily care needs are met  Outcome: Ongoing     Problem: Pain:  Description  Pain management should include both nonpharmacologic and pharmacologic interventions.   Goal: Patient's pain/discomfort is manageable  Description  Patient's pain/discomfort is manageable  Outcome: Ongoing  Goal: Pain level will decrease  Description  Pain level will decrease  Outcome: Ongoing  Goal: Control of acute pain  Description  Control of acute pain  Outcome: Ongoing  Goal: Control of chronic pain  Description  Control of chronic pain  Outcome: Ongoing     Problem: Skin Integrity:  Goal: Skin integrity will stabilize  Description  Skin integrity will stabilize  Outcome: Ongoing     Problem: Discharge Planning:  Goal: Patients continuum of care needs are met  Description  Patients continuum of care needs are met  Outcome: Ongoing

## 2019-06-23 NOTE — PROGRESS NOTES
Pt requesting pain medication for pain in abdomen rating 7/10. PRN  pain medication given, see MAR. SR up x2, Call light and bedside table in easy reach. Denies any other needs at this time.

## 2019-06-23 NOTE — PLAN OF CARE
Problem: Infection:  Goal: Will remain free from infection  Description  Will remain free from infection  6/23/2019 1304 by Calvin Valera RN  Outcome: Ongoing  6/23/2019 0507 by Pierce Fernandes RN  Outcome: Ongoing     Problem: Safety:  Goal: Free from accidental physical injury  Description  Free from accidental physical injury  6/23/2019 1304 by Calvin Valera RN  Outcome: Ongoing  6/23/2019 0507 by Pierce Fernandes RN  Outcome: Ongoing  Goal: Free from intentional harm  Description  Free from intentional harm  6/23/2019 1304 by Calvin Valera RN  Outcome: Ongoing  6/23/2019 0507 by Pierce Fernandes RN  Outcome: Ongoing     Problem: Daily Care:  Goal: Daily care needs are met  Description  Daily care needs are met  6/23/2019 1304 by Calvin Valera RN  Outcome: Ongoing  6/23/2019 0507 by Pierce Fernandes RN  Outcome: Ongoing     Problem: Pain:  Description  Pain management should include both nonpharmacologic and pharmacologic interventions.   Goal: Patient's pain/discomfort is manageable  Description  Patient's pain/discomfort is manageable  6/23/2019 1304 by Calvin Valera RN  Outcome: Ongoing  6/23/2019 0507 by Pierce Fernandes RN  Outcome: Ongoing  Goal: Pain level will decrease  Description  Pain level will decrease  6/23/2019 1304 by Calvin Valera RN  Outcome: Ongoing  6/23/2019 0507 by Pierce Fernandes RN  Outcome: Ongoing  Goal: Control of acute pain  Description  Control of acute pain  6/23/2019 1304 by Calvin Valera RN  Outcome: Ongoing  6/23/2019 0507 by Pierce Fernandes RN  Outcome: Ongoing  Goal: Control of chronic pain  Description  Control of chronic pain  6/23/2019 1304 by Calvin Valera RN  Outcome: Ongoing  6/23/2019 0507 by Pierce Fernandes RN  Outcome: Ongoing     Problem: Skin Integrity:  Goal: Skin integrity will stabilize  Description  Skin integrity will stabilize  6/23/2019 1304 by Calvin Valera RN  Outcome: Ongoing  6/23/2019 0507 by Pierce Fernandes RN  Outcome: Ongoing     Problem: Discharge Planning:  Goal: Patients continuum of care needs are met  Description  Patients continuum of care needs are met  6/23/2019 1304 by Manuel Shannon RN  Outcome: Ongoing  6/23/2019 0507 by Chelsea Mckee RN  Outcome: Ongoing

## 2019-06-23 NOTE — PROGRESS NOTES
PROGRESS NOTE  S:48 yrs Patient  admitted on 6/20/2019 with Acute liver failure due to hepatitis virus [K72.00, B19.9]  Acute liver failure due to hepatitis virus [K72.00, B19.9]  Acute liver failure due to hepatitis virus [K72.00, B19.9] . Today he feels OK. More energy. Complains of diet restriction    Exam:   Vitals:    06/23/19 0330   BP: (!) 146/78   Pulse: 59   Resp: 16   Temp: 98.6 °F (37 °C)   SpO2: 97%      General appearance: alert, appears stated age and cooperative, jaundice  HEENT: Oropharynx clear, no lesions, scleral icterus  Neck: no adenopathy  Lungs: clear to auscultation bilaterally  Heart: regular rate and rhythm, S1, S2 normal, no murmur, click, rub or gallop  Abdomen: soft, non-tender; bowel sounds normal; no masses,  no organomegaly  Extremities: extremities normal, atraumatic, no cyanosis or edema     Medications: Reviewed    Labs:  CBC:   Recent Labs     06/20/19 1945 06/21/19  0520   WBC 9.7 9.1   HGB 16.2 14.7   HCT 46.6 42.6   MCV 90.1 91.3    374     BMP:   Recent Labs     06/20/19 1945 06/21/19 0519 06/22/19  0924    130* 134*   K 4.3 3.9 4.2   CL 98* 97* 100   CO2 27 23 24   BUN 16 13 8   CREATININE 0.6* <0.5* <0.5*     LIVER PROFILE:   Recent Labs     06/20/19 1945 06/21/19 0519 06/22/19  0924   AST 3,339* 2,790* 3,155*   ALT 3,739* 3,444* 3,707*   LIPASE 30.0  --   --    PROT 7.1 6.3* 5.9*   BILIDIR  --   --  7.3*   BILITOT 11.2* 9.5* 10.4*   ALKPHOS 199* 181* 174*     PT/INR:   Recent Labs     06/20/19 1945 06/21/19  0520   INR 1.37* 1.42*     Impression: 52year old male with IVDU admitted with acute hepatitis B    Recommendation:  1. Continue supportive care  2. Await todays labs  3. PT/OT  4. General diet without restrictions  5. OK to d/c if LFTs begin to trend down  6.  Will follow      Theo Khan MD  9:32 AM 6/23/2019

## 2019-06-23 NOTE — PROGRESS NOTES
Medicated with oxycodone for abdominal pain. See mar. Bedside report and pt care transferred to [facility] RN. Dom Masterson, RN

## 2019-06-23 NOTE — PROGRESS NOTES
Progress Note    Admit Date:  6/20/2019  Admitted for acute liver failure     Subjective:  Mr. Aidan Ford is feeling some better. No new issues. Objective:   /72   Pulse 56   Temp 98.1 °F (36.7 °C) (Oral)   Resp 16   Ht 5' 8\" (1.727 m)   Wt 200 lb (90.7 kg)   SpO2 96%   BMI 30.41 kg/m²          Intake/Output Summary (Last 24 hours) at 6/23/2019 1106  Last data filed at 6/22/2019 1749  Gross per 24 hour   Intake 2454 ml   Output 650 ml   Net 1804 ml       Physical Exam:  Gen: No distress. Alert. Eyes: PERRL. + sclera icterus. No conjunctival injection. ENT: No discharge. Pharynx clear. Neck: No JVD. Trachea midline. Resp: No accessory muscle use. No crackles. no wheezes. No rhonchi. CV: Regular rate. Regular rhythm. No murmur. No rub. No edema. Capillary Refill: Brisk,< 3 seconds   Peripheral Pulses: +2 palpable, equal bilaterally   GI: Diffuse TTP. Non-distended. Normal bowel sounds. Skin: Warm and dry. Jaundice  M/S: No cyanosis. No joint deformity. No clubbing. Neuro: Awake. Grossly nonfocal    Psych: Oriented x 3. + anxiety, No agitation.        Scheduled Meds:      Continuous Infusions:      PRN Meds:  nicotine polacrilex, zolpidem, oxyCODONE, sodium chloride flush, magnesium hydroxide, ondansetron      Data:  CBC:   Recent Labs     06/20/19 1945 06/21/19  0520   WBC 9.7 9.1   HGB 16.2 14.7   HCT 46.6 42.6   MCV 90.1 91.3    374     BMP:   Recent Labs     06/20/19 1945 06/21/19 0519 06/22/19  0924    130* 134*   K 4.3 3.9 4.2   CL 98* 97* 100   CO2 27 23 24   BUN 16 13 8   CREATININE 0.6* <0.5* <0.5*     LIVER PROFILE:   Recent Labs     06/20/19 1945 06/21/19 0519 06/22/19  0924   AST 3,339* 2,790* 3,155*   ALT 3,739* 3,444* 3,707*   LIPASE 30.0  --   --    BILIDIR  --   --  7.3*   BILITOT 11.2* 9.5* 10.4*   ALKPHOS 199* 181* 174*     PT/INR:   Recent Labs     06/20/19 1945 06/21/19  0520   PROTIME 15.5* 16.2*   INR 1.37* 1.42*     Magnesium 2.10      Ethanol Lvl None Detected      Ammonia 61High      UDS: negative     Path Consult 06/20/2019  7:45 PM [facility]   Reviewed    Comment:   Peripheral blood smear and histogram are reviewed and show an absolute   monocytosis.  These findings may be related to recovering infection (TB,   toxoplasmosis, endocarditis or sepsis), tissue injury, drugs or   chemotherapy. If no etiology is identified and/or the findings persist,   consider hematology consult.  CPT code: 98106. CULTURES  None     RADIOLOGY  CT ABDOMEN PELVIS W IV CONTRAST Additional Contrast? None   Final Result   Mottled appearance of the hepatic parenchyma, with periportal and   pericholecystic edema, along with edema extending into the nicholas hepatis. These are nonspecific findings, but given the patient's history of jaundice,   hepatitis is the primary consideration. Mild mural thickening of the 2nd portion the duodenum, which is likely   reactive. Increased number of shotty borderline enlarged retroperitoneal lymph nodes,   also probably reactive, but follow-up to resolution is recommended. Indeterminate low-density lesion within the posterior right kidney. After   resolution of acute symptoms, dedicated renal CT or MRI is recommended to   better evaluate that. XR CHEST PORTABLE   Final Result   No acute process. Assessment/Plan:  Acute liver failure improving.  - likely acute hepatitis--hep panel + Hep B Ig M. Unsure how he got it. Repeat labs in am  - stop IVF.  - GI consulted  - supportive care     Coagulopathy  - secondary to above  - continue to monitor. Recheck in am.    Methamphetamine abuse  - Reports use 3x this year   - Recommend cessation   - he will go in patient rehab.     Tobacco Abuse   - Recommend cessation   - PRN Nicotine patch/gum     Anxiety disorder   Insomnia   - Was given Ativan in the ED   - Takes no medications at home   - Will trial Ambien tonight       Homeless  -  consult     R renal lesion   - Noted on CT   - Will need dedicated films after resolution of liver failure     DVT Prophylaxis: SCD  Diet: DIET GENERAL;  Code Status: Full Code     Discharge planning for in patient rehab in [place] 11:06 AM 6/23/2019

## 2019-06-23 NOTE — PROGRESS NOTES
Handoff report and transfer of care given at bedside to [address] [place] [address]. Patient in stable condition, denies needs/concerns at this time. Call light within reach.

## 2019-06-23 NOTE — PROGRESS NOTES
Pt's iv infiltrated, spoke with md, verbal orders to leave iv out. Pt encouraged to eat and drink.  Marko Garcia RN\

## 2019-06-23 NOTE — PROGRESS NOTES
Shift assessment complete; see flow sheet. Scheduled medications administered; See MAR. IV infusing without difficulty. Pt denies any needs at this time.  Call light within reach, bed in low locked position,

## 2019-06-24 LAB
ALBUMIN SERPL-MCNC: 3.4 G/DL (ref 3.4–5)
ALP BLD-CCNC: 201 U/L (ref 40–129)
ALT SERPL-CCNC: 3265 U/L (ref 10–40)
AST SERPL-CCNC: 2054 U/L (ref 15–37)
BILIRUB SERPL-MCNC: 12.1 MG/DL (ref 0–1)
BILIRUBIN DIRECT: 8.3 MG/DL (ref 0–0.3)
BILIRUBIN, INDIRECT: 3.8 MG/DL (ref 0–1)
INR BLD: 1.29 (ref 0.86–1.14)
PROTHROMBIN TIME: 14.7 SEC (ref 9.8–13)
TOTAL PROTEIN: 6.5 G/DL (ref 6.4–8.2)

## 2019-06-24 PROCEDURE — 36415 COLL VENOUS BLD VENIPUNCTURE: CPT

## 2019-06-24 PROCEDURE — 80076 HEPATIC FUNCTION PANEL: CPT

## 2019-06-24 PROCEDURE — 85610 PROTHROMBIN TIME: CPT

## 2019-06-24 PROCEDURE — 6370000000 HC RX 637 (ALT 250 FOR IP): Performed by: INTERNAL MEDICINE

## 2019-06-24 PROCEDURE — 99232 SBSQ HOSP IP/OBS MODERATE 35: CPT | Performed by: INTERNAL MEDICINE

## 2019-06-24 PROCEDURE — 1200000000 HC SEMI PRIVATE

## 2019-06-24 RX ADMIN — OXYCODONE HYDROCHLORIDE 5 MG: 5 TABLET ORAL at 09:06

## 2019-06-24 ASSESSMENT — PAIN - FUNCTIONAL ASSESSMENT: PAIN_FUNCTIONAL_ASSESSMENT: ACTIVITIES ARE NOT PREVENTED

## 2019-06-24 ASSESSMENT — PAIN DESCRIPTION - ORIENTATION: ORIENTATION: MID

## 2019-06-24 ASSESSMENT — PAIN DESCRIPTION - FREQUENCY: FREQUENCY: CONTINUOUS

## 2019-06-24 ASSESSMENT — PAIN SCALES - GENERAL
PAINLEVEL_OUTOF10: 0
PAINLEVEL_OUTOF10: 7

## 2019-06-24 ASSESSMENT — PAIN DESCRIPTION - PAIN TYPE: TYPE: ACUTE PAIN

## 2019-06-24 ASSESSMENT — PAIN DESCRIPTION - PROGRESSION: CLINICAL_PROGRESSION: OTHER (COMMENT)

## 2019-06-24 ASSESSMENT — PAIN DESCRIPTION - LOCATION: LOCATION: ABDOMEN

## 2019-06-24 ASSESSMENT — PAIN DESCRIPTION - DESCRIPTORS: DESCRIPTORS: CONSTANT;SHARP

## 2019-06-24 NOTE — PROGRESS NOTES
AM assessment completed, see flow sheet. Pt is alert and oriented. Vital signs are WNL. Respirations are even & easy on RA. No complaints voiced except Abdomen pain 7/10 medicated see MAR. Pt denies needs at this time. SR up x 2, and bed in low position. Call light is within reach.

## 2019-06-24 NOTE — PROGRESS NOTES
Pt awake in bed. PM assessment complete. Pt denies pain or needs at this time. Call light in reach. Will cont to monitor.  Michelle Ponce

## 2019-06-24 NOTE — PLAN OF CARE
Problem: Infection:  Goal: Will remain free from infection  Description  Will remain free from infection  6/23/2019 2138 by Shivam Ortiz RN  Outcome: Ongoing  6/23/2019 1304 by Jen Bamberger, RN  Outcome: Ongoing     Problem: Safety:  Goal: Free from accidental physical injury  Description  Free from accidental physical injury  6/23/2019 2138 by Shivam Ortiz RN  Outcome: Ongoing  6/23/2019 1304 by Jen Bamberger, RN  Outcome: Ongoing     Problem: Safety:  Goal: Free from intentional harm  Description  Free from intentional harm  6/23/2019 2138 by Shivam Ortiz RN  Outcome: Ongoing  6/23/2019 1304 by Jen Bamberger, RN  Outcome: Ongoing     Problem: Daily Care:  Goal: Daily care needs are met  Description  Daily care needs are met  6/23/2019 2138 by Shivam Ortiz RN  Outcome: Ongoing  6/23/2019 1304 by Jen Bamberger, RN  Outcome: Ongoing     Problem: Pain:  Goal: Patient's pain/discomfort is manageable  Description  Patient's pain/discomfort is manageable  6/23/2019 2138 by Shivam Ortiz RN  Outcome: Ongoing  6/23/2019 1304 by Jen Bamberger, RN  Outcome: Ongoing     Problem: Discharge Planning:  Goal: Patients continuum of care needs are met  Description  Patients continuum of care needs are met  6/23/2019 1304 by Jen Bamberger, RN  Outcome: Ongoing

## 2019-06-24 NOTE — PROGRESS NOTES
Report given @ bedside to [facility], for transferral of care. Pt resting in bed. Call light in reach.

## 2019-06-24 NOTE — PROGRESS NOTES
Progress Note    Admit Date:  6/20/2019  Admitted for acute liver failure     Subjective:  Mr. Elizabeth Mcmillan is feeling some better. No new issues. Awaiting placement at drug rehab center  No nausea  Tolerating diet    Objective:   BP (!) 140/89   Pulse 68   Temp 98.5 °F (36.9 °C) (Oral)   Resp 18   Ht 5' 8\" (1.727 m)   Wt 200 lb (90.7 kg)   SpO2 100%   BMI 30.41 kg/m²          Intake/Output Summary (Last 24 hours) at 6/24/2019 1111  Last data filed at 6/24/2019 0846  Gross per 24 hour   Intake 820 ml   Output 650 ml   Net 170 ml       Physical Exam:  Gen: No distress. Alert. Eyes: PERRL. + sclera icterus. No conjunctival injection. ENT: No discharge. Pharynx clear. Neck: No JVD. Trachea midline. Resp: No accessory muscle use. No crackles. no wheezes. No rhonchi. CV: Regular rate. Regular rhythm. No murmur. No rub. No edema. Capillary Refill: Brisk,< 3 seconds   Peripheral Pulses: +2 palpable, equal bilaterally   GI: Diffuse TTP. Non-distended. Normal bowel sounds. Skin: Warm and dry. Jaundice  M/S: No cyanosis. No joint deformity. No clubbing. Neuro: Awake. Grossly nonfocal    Psych: Oriented x 3. + anxiety, No agitation. Scheduled Meds:      Continuous Infusions:      PRN Meds:  nicotine polacrilex, zolpidem, oxyCODONE, sodium chloride flush, magnesium hydroxide, ondansetron      Data:  CBC:   No results for input(s): WBC, HGB, HCT, MCV, PLT in the last 72 hours. BMP:   Recent Labs     06/22/19 0924   *   K 4.2      CO2 24   BUN 8   CREATININE <0.5*     LIVER PROFILE:   Recent Labs     06/22/19 0924 06/24/19 0456   AST 3,155* 2,054*   ALT 3,707* 3,265*   BILIDIR 7.3* 8.3*   BILITOT 10.4* 12.1*   ALKPHOS 174* 201*     PT/INR:   Recent Labs     06/24/19 0456   PROTIME 14.7*   INR 1.29*     Magnesium 2.10      Ethanol Lvl None Detected      Ammonia 61High      UDS: negative     Path Consult 06/20/2019  7:45 PM [facility]   Reviewed    Comment:   Peripheral blood smear and histogram are reviewed and show an absolute   monocytosis.  These findings may be related to recovering infection (TB,   toxoplasmosis, endocarditis or sepsis), tissue injury, drugs or   chemotherapy. If no etiology is identified and/or the findings persist,   consider hematology consult.  CPT code: 60126. CULTURES  None     RADIOLOGY  CT ABDOMEN PELVIS W IV CONTRAST Additional Contrast? None   Final Result   Mottled appearance of the hepatic parenchyma, with periportal and   pericholecystic edema, along with edema extending into the nicholas hepatis. These are nonspecific findings, but given the patient's history of jaundice,   hepatitis is the primary consideration. Mild mural thickening of the 2nd portion the duodenum, which is likely   reactive. Increased number of shotty borderline enlarged retroperitoneal lymph nodes,   also probably reactive, but follow-up to resolution is recommended. Indeterminate low-density lesion within the posterior right kidney. After   resolution of acute symptoms, dedicated renal CT or MRI is recommended to   better evaluate that. XR CHEST PORTABLE   Final Result   No acute process. Assessment/Plan:    Acute liver failure improving.  - likely acute hepatitis--hep panel + Hep B Ig M. Unsure how he got it. Repeat labs in am  - stop IVF.  - GI consulted  - supportive care   - tolerating diet well  - fw LFT    Coagulopathy  - secondary to above  - continue to monitor. Recheck in am.    Methamphetamine abuse  - Reports use 3x this year   - Recommend cessation   - he will go in patient rehab.     Tobacco Abuse   - Recommend cessation   - PRN Nicotine patch/gum     Anxiety disorder   Insomnia   - Was given Ativan in the ED   - Takes no medications at home   - Will trial Ambien tonight       Homeless  - SW consulted  - for drug rehab in am     Rt renal lesion   - Noted on CT   - Will need dedicated films after resolution of liver failure DVT Prophylaxis: SCD  Diet: DIET GENERAL;  Code Status: Full Code     Discharge planning for [place] patient rehab in [place] 11:11 AM 6/24/2019

## 2019-06-24 NOTE — PROGRESS NOTES
Resting quietly in bed. resp e/e. No s/s distress noted. Call light in reach.  [facility]

## 2019-06-25 PROCEDURE — 99232 SBSQ HOSP IP/OBS MODERATE 35: CPT | Performed by: INTERNAL MEDICINE

## 2019-06-25 PROCEDURE — 1200000000 HC SEMI PRIVATE

## 2019-06-25 PROCEDURE — 6370000000 HC RX 637 (ALT 250 FOR IP): Performed by: INTERNAL MEDICINE

## 2019-06-25 RX ADMIN — OXYCODONE HYDROCHLORIDE 5 MG: 5 TABLET ORAL at 03:27

## 2019-06-25 ASSESSMENT — PAIN SCALES - GENERAL
PAINLEVEL_OUTOF10: 0
PAINLEVEL_OUTOF10: 6

## 2019-06-25 ASSESSMENT — PAIN DESCRIPTION - PAIN TYPE: TYPE: ACUTE PAIN

## 2019-06-25 ASSESSMENT — PAIN DESCRIPTION - LOCATION: LOCATION: ABDOMEN

## 2019-06-25 NOTE — PROGRESS NOTES
Progress Note    Admit Date:  6/20/2019  Admitted for acute liver failure     Subjective:  Mr. Letitia Rush is feeling some better. No new issues. Awaiting placement at drug rehab center- might get a bed today in [place]  No nausea  Tolerating diet    Objective:   /74   Pulse 53   Temp 97 °F (36.1 °C) (Oral)   Resp 18   Ht 5' 8\" (1.727 m)   Wt 200 lb (90.7 kg)   SpO2 95%   BMI 30.41 kg/m²          Intake/Output Summary (Last 24 hours) at 6/25/2019 0743  Last data filed at 6/24/2019 2301  Gross per 24 hour   Intake 1200 ml   Output --   Net 1200 ml       Physical Exam:  Gen: No distress. Alert. Eyes: PERRL. + sclera icterus. No conjunctival injection. ENT: No discharge. Pharynx clear. Neck: No JVD. Trachea midline. Resp: No accessory muscle use. No crackles. no wheezes. No rhonchi. CV: Regular rate. Regular rhythm. No murmur. No rub. No edema. Capillary Refill: Brisk,< 3 seconds   Peripheral Pulses: +2 palpable, equal bilaterally   GI: Diffuse TTP. Non-distended. Normal bowel sounds. Skin: Warm and dry. Jaundice  M/S: No cyanosis. No joint deformity. No clubbing. Neuro: Awake. Grossly nonfocal    Psych: Oriented x 3. + anxiety, No agitation. Scheduled Meds:      Continuous Infusions:      PRN Meds:  nicotine polacrilex, zolpidem, oxyCODONE, sodium chloride flush, magnesium hydroxide, ondansetron      Data:  CBC:   No results for input(s): WBC, HGB, HCT, MCV, PLT in the last 72 hours. BMP:   Recent Labs     06/22/19 0924   *   K 4.2      CO2 24   BUN 8   CREATININE <0.5*     LIVER PROFILE:   Recent Labs     06/22/19 0924 06/24/19 0456   AST 3,155* 2,054*   ALT 3,707* 3,265*   BILIDIR 7.3* 8.3*   BILITOT 10.4* 12.1*   ALKPHOS 174* 201*     PT/INR:   Recent Labs     06/24/19 0456   PROTIME 14.7*   INR 1.29*     Magnesium 2.10      Ethanol Lvl None Detected      Ammonia 61High      UDS: negative     Path Consult 06/20/2019  7:45 PM [facility] - [facility]   Reviewed    Comment: Peripheral blood smear and histogram are reviewed and show an absolute   monocytosis.  These findings may be related to recovering infection (TB,   toxoplasmosis, endocarditis or sepsis), tissue injury, drugs or   chemotherapy. If no etiology is identified and/or the findings persist,   consider hematology consult.  CPT code: 88849. CULTURES  None     RADIOLOGY  CT ABDOMEN PELVIS W IV CONTRAST Additional Contrast? None   Final Result   Mottled appearance of the hepatic parenchyma, with periportal and   pericholecystic edema, along with edema extending into the nicholas hepatis. These are nonspecific findings, but given the patient's history of jaundice,   hepatitis is the primary consideration. Mild mural thickening of the 2nd portion the duodenum, which is likely   reactive. Increased number of shotty borderline enlarged retroperitoneal lymph nodes,   also probably reactive, but follow-up to resolution is recommended. Indeterminate low-density lesion within the posterior right kidney. After   resolution of acute symptoms, dedicated renal CT or MRI is recommended to   better evaluate that. XR CHEST PORTABLE   Final Result   No acute process. Assessment/Plan:    Acute liver failure improving.  - likely acute hepatitis--hep panel + Hep B Ig M. Unsure how he got it. Repeat labs in am  - stop IVF.  - GI consulted  - supportive care   - tolerating diet well  - fw LFT in 2 weeks     Coagulopathy - mild , INR at 1.3  - secondary to above  - continue to monitor. Methamphetamine abuse  - Reports use 3x this year   - Recommend cessation   - he will go in patient rehab.  In [facility] today    Tobacco Abuse   - Recommend cessation   - PRN Nicotine patch/gum     Anxiety disorder   Insomnia   - Was given Ativan in the ED   - Takes no medications at home   - Will trial Ambien tonight       Homeless  - SW consulted  - for drug rehab in am     Rt renal lesion   - Noted on CT   - Will

## 2019-06-25 NOTE — PROGRESS NOTES
Report given @ bedside to Jorden Botello, for transferral of care. Pt resting in bed. Pt stating \"going to sneak out to smoke again I'm not going to lie about it\"r/t frustration of not having bed at [facility] again today. Reviewed policy and prn nicotine gum available or could sign out AMA if not agreeable to not leave floor per policy. Pt said \"can't do that have to go rehab cause has no where else to go. \" Pt now agreeing to stay in room and not leave unit at this time. Call light in reach.
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
## 2019-06-26 VITALS
BODY MASS INDEX: 30.31 KG/M2 | RESPIRATION RATE: 18 BRPM | HEIGHT: 68 IN | WEIGHT: 200 LBS | OXYGEN SATURATION: 98 % | TEMPERATURE: 96.7 F | DIASTOLIC BLOOD PRESSURE: 88 MMHG | HEART RATE: 57 BPM | SYSTOLIC BLOOD PRESSURE: 148 MMHG

## 2019-06-26 LAB
A/G RATIO: 1.1 (ref 1.1–2.2)
ALBUMIN SERPL-MCNC: 3.5 G/DL (ref 3.4–5)
ALP BLD-CCNC: 198 U/L (ref 40–129)
ALT SERPL-CCNC: 1563 U/L (ref 10–40)
ANION GAP SERPL CALCULATED.3IONS-SCNC: 9 MMOL/L (ref 3–16)
AST SERPL-CCNC: 506 U/L (ref 15–37)
BILIRUB SERPL-MCNC: 11 MG/DL (ref 0–1)
BUN BLDV-MCNC: 11 MG/DL (ref 7–20)
CALCIUM SERPL-MCNC: 9.5 MG/DL (ref 8.3–10.6)
CHLORIDE BLD-SCNC: 100 MMOL/L (ref 99–110)
CO2: 26 MMOL/L (ref 21–32)
CREAT SERPL-MCNC: 0.6 MG/DL (ref 0.9–1.3)
GFR AFRICAN AMERICAN: >60
GFR NON-AFRICAN AMERICAN: >60
GLOBULIN: 3.3 G/DL
GLUCOSE BLD-MCNC: 123 MG/DL (ref 70–99)
POTASSIUM SERPL-SCNC: 4.2 MMOL/L (ref 3.5–5.1)
SODIUM BLD-SCNC: 135 MMOL/L (ref 136–145)
TOTAL PROTEIN: 6.8 G/DL (ref 6.4–8.2)

## 2019-06-26 PROCEDURE — 36415 COLL VENOUS BLD VENIPUNCTURE: CPT

## 2019-06-26 PROCEDURE — 99238 HOSP IP/OBS DSCHRG MGMT 30/<: CPT | Performed by: INTERNAL MEDICINE

## 2019-06-26 PROCEDURE — 80053 COMPREHEN METABOLIC PANEL: CPT

## 2019-06-26 RX ORDER — ONDANSETRON 4 MG/1
4 TABLET, ORALLY DISINTEGRATING ORAL EVERY 8 HOURS PRN
Qty: 20 TABLET | Refills: 0 | Status: SHIPPED | OUTPATIENT
Start: 2019-06-26 | End: 2019-06-28 | Stop reason: ALTCHOICE

## 2019-06-26 NOTE — PLAN OF CARE
Problem: Infection:  Goal: Will remain free from infection  Description  Will remain free from infection  Outcome: Ongoing     Problem: Safety:  Goal: Free from accidental physical injury  Description  Free from accidental physical injury  Outcome: Ongoing  Goal: Free from intentional harm  Description  Free from intentional harm  Outcome: Ongoing     Problem: Daily Care:  Goal: Daily care needs are met  Description  Daily care needs are met  Outcome: Ongoing     Problem: Pain:  Goal: Patient's pain/discomfort is manageable  Description  Patient's pain/discomfort is manageable  Outcome: Ongoing  Goal: Pain level will decrease  Description  Pain level will decrease  Outcome: Ongoing  Goal: Control of acute pain  Description  Control of acute pain  Outcome: Ongoing     Problem: Skin Integrity:  Goal: Skin integrity will stabilize  Description  Skin integrity will stabilize  Outcome: Ongoing     Problem: Discharge Planning:  Goal: Patients continuum of care needs are met  Description  Patients continuum of care needs are met  Outcome: Ongoing

## 2019-06-26 NOTE — PROGRESS NOTES
Pt awake in bed. PM assessment complete. No needs voiced. Call light in reach. Will cont to monitor.  Maria Teresa Christina

## 2019-06-26 NOTE — CARE COORDINATION
DISCHARGE ORDER  Date/Time 2019 5:20 PM  Completed by: Kamari Edwards, Case Management    Patient Name: Lilo Feldman    : 1970  Admitting Diagnosis: Acute liver failure due to hepatitis virus [K72.00, B19.9]  Acute liver failure due to hepatitis virus [K72.00, B19.9]  Acute liver failure due to hepatitis virus [K72.00, B19.9]  Admit Date/Time: 2019  7:37 PM    Noted discharge order. Confirmed discharge plan with patient / family (pt): Yes   Discharge Plan: Chart reviewed and role of dcp explained. 1100 Pt sleeping in bed. Pt wakes and when asked if he called [facility] he states he did and they do not have a bed. Pt encourged at this time to call other rehab centers from Resource folder and pt agrees to do this. 1500 TC from [facility] with [address] and she states she called [facility] and they may  have a male bed open this afternoon. [facility] states she spoke with pt and told him he is to call [facility] regarding bed. Met with pt who is sleeping, stressed to him that he needs to call the shelter list to find a bed and he agrees he will do this. Also spoke with pt that he will need to make phone calls to other rehabs other than [facility] and pt agrees he has Resource folder with numbers listed. No further dc needs identified. Discharge timeout done with SANDRA Espinoza. All discharge needs and concerns addressed.
Clinic: No  Community Mental Health: No    Wound Clinic: No     Other:     DISCHARGE PLAN: Reviewed chart. CM received a consult regarding pt being homeless. Role of discharge planner explained and patient verbalized understanding. Pt states that he is homeless. Pt states that he last drank alcohol 8 months ago. Pt states that he smokes meth (and does not use IV) and smokes marijuana. Pt states tht he last used last week. Pt states that he is interested in an inpt program for drugs. Pt was given a Resource Folder. Pt asked for CM to call [facility]. CM called [facility] and spoke with [facility] an [facility] stated that she will come and see pt today. Pt is aware of this. Pt states that he is looking for an inpt drug rehab. Pt does have [employer]. [facility] is aware of this. CM informed pt's RN, Junie, of this, as well. Pt may need [facility]. CM informed Junie, RN, so if pt is to discharge this weekend and not going to an inpt program, but needs medications, that [facility] could fill for pt and have them locked up for the pt for the weekend. If pt does not go into an inpt program, them pt plans to go back [place] the woods. Explained Case Management role/services. Addendum 6/21/19 1245: [facility] with [facility] met with pt. [facility] states that pt seems to think mahin the will be here for 1 week, but [facility] states that she plans for pt to go to [facility], but pt was wanting to further discuss with her on Monday. [facility] informed pt that he has her card and he could call her. Per Dr. Barbara Hernandez, awaiting GI to see an eval pt.

## 2019-06-27 NOTE — PROGRESS NOTES
[facility] does not have a male bed open today. Pt is upset that he has no where to go. I offered him a cab voucher and encouraged him to continue to call [place] and other places in the resource folder.

## 2019-06-28 ENCOUNTER — APPOINTMENT (OUTPATIENT)
Dept: CT IMAGING | Age: 49
DRG: 204 | End: 2019-06-28
Payer: COMMERCIAL

## 2019-06-28 ENCOUNTER — APPOINTMENT (OUTPATIENT)
Dept: GENERAL RADIOLOGY | Age: 49
DRG: 204 | End: 2019-06-28
Payer: COMMERCIAL

## 2019-06-28 ENCOUNTER — HOSPITAL ENCOUNTER (INPATIENT)
Age: 49
LOS: 1 days | Discharge: HOME | DRG: 204 | End: 2019-07-02
Attending: EMERGENCY MEDICINE | Admitting: INTERNAL MEDICINE
Payer: COMMERCIAL

## 2019-06-28 DIAGNOSIS — I63.81 INFARCTION OF LEFT THALAMUS (HCC): Primary | ICD-10-CM

## 2019-06-28 DIAGNOSIS — R55 SYNCOPE AND COLLAPSE: ICD-10-CM

## 2019-06-28 LAB
A/G RATIO: 1.1 (ref 1.1–2.2)
ALBUMIN SERPL-MCNC: 3.9 G/DL (ref 3.4–5)
ALP BLD-CCNC: 179 U/L (ref 40–129)
ALT SERPL-CCNC: 771 U/L (ref 10–40)
AMMONIA: 55 UMOL/L (ref 16–60)
AMPHETAMINE SCREEN, URINE: ABNORMAL
ANION GAP SERPL CALCULATED.3IONS-SCNC: 15 MMOL/L (ref 3–16)
AST SERPL-CCNC: 203 U/L (ref 15–37)
BARBITURATE SCREEN URINE: ABNORMAL
BASOPHILS ABSOLUTE: 0.1 K/UL (ref 0–0.2)
BASOPHILS RELATIVE PERCENT: 1.3 %
BENZODIAZEPINE SCREEN, URINE: ABNORMAL
BILIRUB SERPL-MCNC: 8.6 MG/DL (ref 0–1)
BILIRUBIN URINE: ABNORMAL
BLOOD, URINE: NEGATIVE
BUN BLDV-MCNC: 12 MG/DL (ref 7–20)
CALCIUM SERPL-MCNC: 9.2 MG/DL (ref 8.3–10.6)
CANNABINOID SCREEN URINE: POSITIVE
CHLORIDE BLD-SCNC: 96 MMOL/L (ref 99–110)
CLARITY: CLEAR
CO2: 24 MMOL/L (ref 21–32)
COCAINE METABOLITE SCREEN URINE: ABNORMAL
COLOR: ABNORMAL
CREAT SERPL-MCNC: 0.7 MG/DL (ref 0.9–1.3)
EOSINOPHILS ABSOLUTE: 0.2 K/UL (ref 0–0.6)
EOSINOPHILS RELATIVE PERCENT: 1.5 %
GFR AFRICAN AMERICAN: >60
GFR NON-AFRICAN AMERICAN: >60
GLOBULIN: 3.7 G/DL
GLUCOSE BLD-MCNC: 79 MG/DL (ref 70–99)
GLUCOSE BLD-MCNC: 95 MG/DL (ref 70–99)
GLUCOSE URINE: NEGATIVE MG/DL
HCT VFR BLD CALC: 42.1 % (ref 40.5–52.5)
HEMOGLOBIN: 14.8 G/DL (ref 13.5–17.5)
KETONES, URINE: NEGATIVE MG/DL
LEUKOCYTE ESTERASE, URINE: NEGATIVE
LYMPHOCYTES ABSOLUTE: 1.9 K/UL (ref 1–5.1)
LYMPHOCYTES RELATIVE PERCENT: 19.5 %
Lab: ABNORMAL
MCH RBC QN AUTO: 31.5 PG (ref 26–34)
MCHC RBC AUTO-ENTMCNC: 35.2 G/DL (ref 31–36)
MCV RBC AUTO: 89.6 FL (ref 80–100)
METHADONE SCREEN, URINE: ABNORMAL
MICROSCOPIC EXAMINATION: ABNORMAL
MONOCYTES ABSOLUTE: 1 K/UL (ref 0–1.3)
MONOCYTES RELATIVE PERCENT: 10.5 %
NEUTROPHILS ABSOLUTE: 6.7 K/UL (ref 1.7–7.7)
NEUTROPHILS RELATIVE PERCENT: 67.2 %
NITRITE, URINE: NEGATIVE
OPIATE SCREEN URINE: ABNORMAL
OXYCODONE URINE: ABNORMAL
PDW BLD-RTO: 18.1 % (ref 12.4–15.4)
PERFORMED ON: NORMAL
PH UA: 5.5
PH UA: 5.5 (ref 5–8)
PHENCYCLIDINE SCREEN URINE: ABNORMAL
PLATELET # BLD: 483 K/UL (ref 135–450)
PMV BLD AUTO: 8.2 FL (ref 5–10.5)
POTASSIUM SERPL-SCNC: 3.6 MMOL/L (ref 3.5–5.1)
PROPOXYPHENE SCREEN: ABNORMAL
PROTEIN UA: NEGATIVE MG/DL
RBC # BLD: 4.7 M/UL (ref 4.2–5.9)
SODIUM BLD-SCNC: 135 MMOL/L (ref 136–145)
SPECIFIC GRAVITY UA: 1.02 (ref 1–1.03)
TOTAL CK: 64 U/L (ref 39–308)
TOTAL PROTEIN: 7.6 G/DL (ref 6.4–8.2)
TROPONIN: <0.01 NG/ML
URINE TYPE: ABNORMAL
UROBILINOGEN, URINE: 2 E.U./DL
WBC # BLD: 10 K/UL (ref 4–11)

## 2019-06-28 PROCEDURE — 80307 DRUG TEST PRSMV CHEM ANLYZR: CPT

## 2019-06-28 PROCEDURE — 71046 X-RAY EXAM CHEST 2 VIEWS: CPT

## 2019-06-28 PROCEDURE — 82550 ASSAY OF CK (CPK): CPT

## 2019-06-28 PROCEDURE — 80053 COMPREHEN METABOLIC PANEL: CPT

## 2019-06-28 PROCEDURE — 99285 EMERGENCY DEPT VISIT HI MDM: CPT

## 2019-06-28 PROCEDURE — 82140 ASSAY OF AMMONIA: CPT

## 2019-06-28 PROCEDURE — 84484 ASSAY OF TROPONIN QUANT: CPT

## 2019-06-28 PROCEDURE — 85025 COMPLETE CBC W/AUTO DIFF WBC: CPT

## 2019-06-28 PROCEDURE — 2580000003 HC RX 258: Performed by: NURSE PRACTITIONER

## 2019-06-28 PROCEDURE — 70450 CT HEAD/BRAIN W/O DYE: CPT

## 2019-06-28 PROCEDURE — 93005 ELECTROCARDIOGRAM TRACING: CPT | Performed by: EMERGENCY MEDICINE

## 2019-06-28 PROCEDURE — 81003 URINALYSIS AUTO W/O SCOPE: CPT

## 2019-06-28 RX ORDER — 0.9 % SODIUM CHLORIDE 0.9 %
1000 INTRAVENOUS SOLUTION INTRAVENOUS ONCE
Status: COMPLETED | OUTPATIENT
Start: 2019-06-28 | End: 2019-06-28

## 2019-06-28 RX ADMIN — SODIUM CHLORIDE 1000 ML: 9 INJECTION, SOLUTION INTRAVENOUS at 20:41

## 2019-06-28 ASSESSMENT — ENCOUNTER SYMPTOMS
SHORTNESS OF BREATH: 0
BACK PAIN: 0
SORE THROAT: 0
NAUSEA: 0
COUGH: 0
VOMITING: 0
WHEEZING: 0
COLOR CHANGE: 0
DIARRHEA: 0
ABDOMINAL PAIN: 0

## 2019-06-29 ENCOUNTER — APPOINTMENT (OUTPATIENT)
Dept: MRI IMAGING | Age: 49
DRG: 204 | End: 2019-06-29
Payer: COMMERCIAL

## 2019-06-29 PROBLEM — R55 SYNCOPE AND COLLAPSE: Status: ACTIVE | Noted: 2019-06-29

## 2019-06-29 PROBLEM — I63.81 THALAMIC INFARCTION (HCC): Status: ACTIVE | Noted: 2019-06-29

## 2019-06-29 PROBLEM — I63.81 LEFT THALAMIC INFARCTION (HCC): Status: ACTIVE | Noted: 2019-06-29

## 2019-06-29 LAB
EKG ATRIAL RATE: 71 BPM
EKG DIAGNOSIS: NORMAL
EKG P AXIS: 31 DEGREES
EKG P-R INTERVAL: 148 MS
EKG Q-T INTERVAL: 404 MS
EKG QRS DURATION: 102 MS
EKG QTC CALCULATION (BAZETT): 439 MS
EKG R AXIS: -48 DEGREES
EKG T AXIS: 53 DEGREES
EKG VENTRICULAR RATE: 71 BPM
LV EF: 55 %
LVEF MODALITY: NORMAL
TROPONIN: <0.01 NG/ML
TROPONIN: <0.01 NG/ML

## 2019-06-29 PROCEDURE — 87390 HIV-1 AG IA: CPT

## 2019-06-29 PROCEDURE — G0378 HOSPITAL OBSERVATION PER HR: HCPCS

## 2019-06-29 PROCEDURE — 97165 OT EVAL LOW COMPLEX 30 MIN: CPT

## 2019-06-29 PROCEDURE — 70549 MR ANGIOGRAPH NECK W/O&W/DYE: CPT

## 2019-06-29 PROCEDURE — A9579 GAD-BASE MR CONTRAST NOS,1ML: HCPCS | Performed by: NURSE PRACTITIONER

## 2019-06-29 PROCEDURE — 93306 TTE W/DOPPLER COMPLETE: CPT

## 2019-06-29 PROCEDURE — 2580000003 HC RX 258: Performed by: NURSE PRACTITIONER

## 2019-06-29 PROCEDURE — 6360000002 HC RX W HCPCS: Performed by: NURSE PRACTITIONER

## 2019-06-29 PROCEDURE — 70551 MRI BRAIN STEM W/O DYE: CPT

## 2019-06-29 PROCEDURE — 6360000004 HC RX CONTRAST MEDICATION: Performed by: NURSE PRACTITIONER

## 2019-06-29 PROCEDURE — 86701 HIV-1ANTIBODY: CPT

## 2019-06-29 PROCEDURE — 70544 MR ANGIOGRAPHY HEAD W/O DYE: CPT

## 2019-06-29 PROCEDURE — 36415 COLL VENOUS BLD VENIPUNCTURE: CPT

## 2019-06-29 PROCEDURE — 86702 HIV-2 ANTIBODY: CPT

## 2019-06-29 PROCEDURE — 97530 THERAPEUTIC ACTIVITIES: CPT

## 2019-06-29 PROCEDURE — 97161 PT EVAL LOW COMPLEX 20 MIN: CPT

## 2019-06-29 PROCEDURE — 84484 ASSAY OF TROPONIN QUANT: CPT

## 2019-06-29 PROCEDURE — 93010 ELECTROCARDIOGRAM REPORT: CPT | Performed by: INTERNAL MEDICINE

## 2019-06-29 PROCEDURE — 6370000000 HC RX 637 (ALT 250 FOR IP): Performed by: NURSE PRACTITIONER

## 2019-06-29 PROCEDURE — 97116 GAIT TRAINING THERAPY: CPT

## 2019-06-29 RX ORDER — ASPIRIN 300 MG/1
300 SUPPOSITORY RECTAL DAILY
Status: DISCONTINUED | OUTPATIENT
Start: 2019-06-29 | End: 2019-06-29

## 2019-06-29 RX ORDER — SODIUM CHLORIDE 0.9 % (FLUSH) 0.9 %
10 SYRINGE (ML) INJECTION PRN
Status: DISCONTINUED | OUTPATIENT
Start: 2019-06-29 | End: 2019-07-02 | Stop reason: HOSPADM

## 2019-06-29 RX ORDER — SODIUM CHLORIDE 0.9 % (FLUSH) 0.9 %
10 SYRINGE (ML) INJECTION EVERY 12 HOURS SCHEDULED
Status: DISCONTINUED | OUTPATIENT
Start: 2019-06-29 | End: 2019-07-02 | Stop reason: HOSPADM

## 2019-06-29 RX ORDER — ONDANSETRON 2 MG/ML
4 INJECTION INTRAMUSCULAR; INTRAVENOUS EVERY 6 HOURS PRN
Status: DISCONTINUED | OUTPATIENT
Start: 2019-06-29 | End: 2019-07-02 | Stop reason: HOSPADM

## 2019-06-29 RX ORDER — ATORVASTATIN CALCIUM 80 MG/1
80 TABLET, FILM COATED ORAL NIGHTLY
Status: DISCONTINUED | OUTPATIENT
Start: 2019-06-29 | End: 2019-07-02 | Stop reason: HOSPADM

## 2019-06-29 RX ORDER — ASPIRIN 81 MG/1
81 TABLET ORAL DAILY
Status: DISCONTINUED | OUTPATIENT
Start: 2019-06-29 | End: 2019-07-02 | Stop reason: HOSPADM

## 2019-06-29 RX ADMIN — Medication 10 ML: at 22:04

## 2019-06-29 RX ADMIN — GADOTERIDOL 15 ML: 279.3 INJECTION, SOLUTION INTRAVENOUS at 13:51

## 2019-06-29 RX ADMIN — ATORVASTATIN CALCIUM 80 MG: 80 TABLET, FILM COATED ORAL at 21:13

## 2019-06-29 RX ADMIN — Medication 10 ML: at 10:51

## 2019-06-29 RX ADMIN — ASPIRIN 81 MG: 81 TABLET ORAL at 10:09

## 2019-06-29 ASSESSMENT — PAIN SCALES - GENERAL
PAINLEVEL_OUTOF10: 4
PAINLEVEL_OUTOF10: 0

## 2019-06-29 ASSESSMENT — PAIN DESCRIPTION - LOCATION: LOCATION: ABDOMEN

## 2019-06-29 ASSESSMENT — PAIN DESCRIPTION - PROGRESSION: CLINICAL_PROGRESSION: NOT CHANGED

## 2019-06-29 ASSESSMENT — PAIN DESCRIPTION - FREQUENCY: FREQUENCY: CONTINUOUS

## 2019-06-29 ASSESSMENT — PAIN DESCRIPTION - DESCRIPTORS: DESCRIPTORS: CONSTANT

## 2019-06-29 ASSESSMENT — PAIN DESCRIPTION - ONSET: ONSET: ON-GOING

## 2019-06-29 ASSESSMENT — PAIN DESCRIPTION - PAIN TYPE: TYPE: ACUTE PAIN

## 2019-06-29 ASSESSMENT — PAIN DESCRIPTION - ORIENTATION: ORIENTATION: LEFT;UPPER

## 2019-06-30 LAB
CHOLESTEROL, TOTAL: 180 MG/DL (ref 0–199)
HCT VFR BLD CALC: 40 % (ref 40.5–52.5)
HDLC SERPL-MCNC: 10 MG/DL (ref 40–60)
HEMOGLOBIN: 13.9 G/DL (ref 13.5–17.5)
HIV AG/AB: NORMAL
HIV ANTIGEN: NORMAL
HIV-1 ANTIBODY: NORMAL
HIV-2 AB: NORMAL
LDL CHOLESTEROL CALCULATED: ABNORMAL MG/DL
LDL CHOLESTEROL DIRECT: 44 MG/DL
MCH RBC QN AUTO: 31.2 PG (ref 26–34)
MCHC RBC AUTO-ENTMCNC: 34.7 G/DL (ref 31–36)
MCV RBC AUTO: 89.9 FL (ref 80–100)
PDW BLD-RTO: 18.4 % (ref 12.4–15.4)
PLATELET # BLD: 415 K/UL (ref 135–450)
PMV BLD AUTO: 8.6 FL (ref 5–10.5)
RBC # BLD: 4.45 M/UL (ref 4.2–5.9)
TRIGL SERPL-MCNC: 355 MG/DL (ref 0–150)
VLDLC SERPL CALC-MCNC: ABNORMAL MG/DL
WBC # BLD: 9.7 K/UL (ref 4–11)

## 2019-06-30 PROCEDURE — 2580000003 HC RX 258: Performed by: NURSE PRACTITIONER

## 2019-06-30 PROCEDURE — 6370000000 HC RX 637 (ALT 250 FOR IP): Performed by: NURSE PRACTITIONER

## 2019-06-30 PROCEDURE — G0378 HOSPITAL OBSERVATION PER HR: HCPCS

## 2019-06-30 PROCEDURE — 36415 COLL VENOUS BLD VENIPUNCTURE: CPT

## 2019-06-30 PROCEDURE — 83036 HEMOGLOBIN GLYCOSYLATED A1C: CPT

## 2019-06-30 PROCEDURE — 80061 LIPID PANEL: CPT

## 2019-06-30 PROCEDURE — 85027 COMPLETE CBC AUTOMATED: CPT

## 2019-06-30 RX ADMIN — Medication 10 ML: at 22:28

## 2019-06-30 RX ADMIN — Medication 10 ML: at 08:43

## 2019-06-30 RX ADMIN — ATORVASTATIN CALCIUM 80 MG: 80 TABLET, FILM COATED ORAL at 23:09

## 2019-06-30 RX ADMIN — ASPIRIN 81 MG: 81 TABLET ORAL at 08:42

## 2019-06-30 ASSESSMENT — PAIN SCALES - GENERAL
PAINLEVEL_OUTOF10: 0

## 2019-07-01 PROBLEM — I63.9 ACUTE CEREBROVASCULAR ACCIDENT (CVA) (HCC): Status: ACTIVE | Noted: 2019-07-01

## 2019-07-01 LAB
ESTIMATED AVERAGE GLUCOSE: 105.4 MG/DL
HBA1C MFR BLD: 5.3 %

## 2019-07-01 PROCEDURE — 1200000000 HC SEMI PRIVATE

## 2019-07-01 PROCEDURE — 2580000003 HC RX 258: Performed by: NURSE PRACTITIONER

## 2019-07-01 PROCEDURE — 95819 EEG AWAKE AND ASLEEP: CPT

## 2019-07-01 PROCEDURE — 6370000000 HC RX 637 (ALT 250 FOR IP): Performed by: NURSE PRACTITIONER

## 2019-07-01 RX ORDER — ASPIRIN 81 MG/1
81 TABLET ORAL DAILY
Qty: 30 TABLET | Refills: 0 | Status: SHIPPED | OUTPATIENT
Start: 2019-07-02 | End: 2020-04-15

## 2019-07-01 RX ORDER — ATORVASTATIN CALCIUM 40 MG/1
80 TABLET, FILM COATED ORAL NIGHTLY
Qty: 60 TABLET | Refills: 0 | Status: SHIPPED | OUTPATIENT
Start: 2019-07-01 | End: 2020-04-15

## 2019-07-01 RX ADMIN — ASPIRIN 81 MG: 81 TABLET ORAL at 08:58

## 2019-07-01 RX ADMIN — Medication 10 ML: at 08:59

## 2019-07-01 RX ADMIN — ATORVASTATIN CALCIUM 80 MG: 80 TABLET, FILM COATED ORAL at 19:40

## 2019-07-01 RX ADMIN — Medication 10 ML: at 19:40

## 2019-07-01 ASSESSMENT — PAIN SCALES - GENERAL
PAINLEVEL_OUTOF10: 0

## 2019-07-02 VITALS
TEMPERATURE: 97.9 F | HEIGHT: 68 IN | RESPIRATION RATE: 16 BRPM | WEIGHT: 175.2 LBS | SYSTOLIC BLOOD PRESSURE: 134 MMHG | DIASTOLIC BLOOD PRESSURE: 83 MMHG | HEART RATE: 57 BPM | BODY MASS INDEX: 26.55 KG/M2 | OXYGEN SATURATION: 95 %

## 2019-07-02 PROCEDURE — 6370000000 HC RX 637 (ALT 250 FOR IP): Performed by: NURSE PRACTITIONER

## 2019-07-02 RX ADMIN — ASPIRIN 81 MG: 81 TABLET ORAL at 08:16

## 2019-07-02 ASSESSMENT — PAIN SCALES - GENERAL: PAINLEVEL_OUTOF10: 0

## 2019-07-11 NOTE — DISCHARGE SUMMARY
Name:  Noman Grande  Room:  0203/0203-01  MRN:    [id number]    IM Discharge Summary    Discharging Physician:  Kayode Dhillon MD    Admit: 6/20/2019    Discharge:  6/26/2019    PCP      No primary care provider on file. Diagnoses and hospital course  this Admission        Acute liver failure improving.  - Acute hepatitis-- + Hep B Ig M. Unsure how he got it. Repeat LFt improving slightly  - stop IVF.  - GI consulted  - supportive care   - tolerating diet well  - fw LFT in 2 weeks      Coagulopathy - mild , INR at 1.3  - secondary to above  - continue to monitor.      Methamphetamine abuse  - Reports use 3x this year   - Recommend cessation   - he will go in patient rehab in [place] today     Tobacco Abuse   - Recommend cessation   - PRN Nicotine patch/gum      Anxiety disorder   Insomnia   - Was given Ativan in the ED   - Takes no medications at home          Homeless  - SW consulted  - for drug rehab in am      Rt renal lesion   - Noted on CT   - Will need dedicated films after resolution of liver failure                HPI   50 y.o. male who presented to the hospital with a chief complaint of turning yellow and abdominal pain. The patient is homeless male states [place]. He is says about 10 days ago he started noticing that he was turning yellow. He also about the same time developed left upper quadrant abdominal pain. He states that his stools is light colored and his urine is very dark. He denies any itching, nausea, vomiting or dysuria. He denies any IV drug abuse and says he is in a monogamous relationship with his wife of 10 years. He does endorse methamphetamine abuse but says his only used 3 times this year. He has not had any recent blood transfusions and does not eat anything out of the ordinary. In the emergency room he was found to be in acute liver failure. He was [facility] to [facility] for GI evaluation. Physical Exam at Discharge:        Gen: No distress.

## 2019-07-23 ENCOUNTER — HOSPITAL ENCOUNTER (EMERGENCY)
Age: 49
Discharge: HOME | End: 2019-07-23
Attending: EMERGENCY MEDICINE
Payer: MEDICAID

## 2019-07-23 VITALS
TEMPERATURE: 98.3 F | RESPIRATION RATE: 18 BRPM | BODY MASS INDEX: 26.52 KG/M2 | HEART RATE: 65 BPM | OXYGEN SATURATION: 99 % | SYSTOLIC BLOOD PRESSURE: 118 MMHG | DIASTOLIC BLOOD PRESSURE: 68 MMHG | HEIGHT: 68 IN | WEIGHT: 175 LBS

## 2019-07-23 DIAGNOSIS — R53.1 GENERALIZED WEAKNESS: Primary | ICD-10-CM

## 2019-07-23 DIAGNOSIS — B16.9 ACUTE VIRAL HEPATITIS B WITHOUT COMA AND WITHOUT DELTA AGENT: ICD-10-CM

## 2019-07-23 LAB
ALBUMIN SERPL-MCNC: 3.7 G/DL (ref 3.4–5)
ALBUMIN SERPL-MCNC: 3.8 G/DL (ref 3.4–5)
ALP BLD-CCNC: 210 U/L (ref 40–129)
ALP BLD-CCNC: 213 U/L (ref 40–129)
ALT SERPL-CCNC: 775 U/L (ref 10–40)
ALT SERPL-CCNC: >7000 U/L (ref 10–40)
ANION GAP SERPL CALCULATED.3IONS-SCNC: 10 MMOL/L (ref 3–16)
AST SERPL-CCNC: 556 U/L (ref 15–37)
AST SERPL-CCNC: 572 U/L (ref 15–37)
BANDED NEUTROPHILS RELATIVE PERCENT: 1 % (ref 0–7)
BASOPHILS ABSOLUTE: 0 K/UL (ref 0–0.2)
BASOPHILS RELATIVE PERCENT: 0 %
BILIRUB SERPL-MCNC: 2 MG/DL (ref 0–1)
BILIRUB SERPL-MCNC: 2.2 MG/DL (ref 0–1)
BILIRUBIN DIRECT: 1.1 MG/DL (ref 0–0.3)
BILIRUBIN DIRECT: 1.2 MG/DL (ref 0–0.3)
BILIRUBIN URINE: NEGATIVE
BILIRUBIN, INDIRECT: 0.9 MG/DL (ref 0–1)
BILIRUBIN, INDIRECT: 1 MG/DL (ref 0–1)
BLOOD, URINE: NEGATIVE
BUN BLDV-MCNC: 13 MG/DL (ref 7–20)
CALCIUM SERPL-MCNC: 9.4 MG/DL (ref 8.3–10.6)
CHLORIDE BLD-SCNC: 103 MMOL/L (ref 99–110)
CLARITY: CLEAR
CO2: 28 MMOL/L (ref 21–32)
COLOR: YELLOW
CREAT SERPL-MCNC: 1 MG/DL (ref 0.9–1.3)
EOSINOPHILS ABSOLUTE: 0.1 K/UL (ref 0–0.6)
EOSINOPHILS RELATIVE PERCENT: 1 %
GFR AFRICAN AMERICAN: >60
GFR NON-AFRICAN AMERICAN: >60
GLUCOSE BLD-MCNC: 121 MG/DL (ref 70–99)
GLUCOSE URINE: NEGATIVE MG/DL
HCT VFR BLD CALC: 41.7 % (ref 40.5–52.5)
HEMOGLOBIN: 14.3 G/DL (ref 13.5–17.5)
KETONES, URINE: NEGATIVE MG/DL
LEUKOCYTE ESTERASE, URINE: NEGATIVE
LYMPHOCYTES ABSOLUTE: 2.7 K/UL (ref 1–5.1)
LYMPHOCYTES RELATIVE PERCENT: 39 %
MCH RBC QN AUTO: 33.2 PG (ref 26–34)
MCHC RBC AUTO-ENTMCNC: 34.3 G/DL (ref 31–36)
MCV RBC AUTO: 96.8 FL (ref 80–100)
MICROSCOPIC EXAMINATION: NORMAL
MONOCYTES ABSOLUTE: 0.2 K/UL (ref 0–1.3)
MONOCYTES RELATIVE PERCENT: 3 %
NEUTROPHILS ABSOLUTE: 3.9 K/UL (ref 1.7–7.7)
NEUTROPHILS RELATIVE PERCENT: 56 %
NITRITE, URINE: NEGATIVE
PDW BLD-RTO: 18 % (ref 12.4–15.4)
PH UA: 5.5 (ref 5–8)
PLATELET # BLD: 381 K/UL (ref 135–450)
PMV BLD AUTO: 8 FL (ref 5–10.5)
POTASSIUM SERPL-SCNC: 4.2 MMOL/L (ref 3.5–5.1)
PROTEIN UA: NEGATIVE MG/DL
RBC # BLD: 4.31 M/UL (ref 4.2–5.9)
SODIUM BLD-SCNC: 141 MMOL/L (ref 136–145)
SPECIFIC GRAVITY UA: >=1.03 (ref 1–1.03)
TOTAL PROTEIN: 7 G/DL (ref 6.4–8.2)
TOTAL PROTEIN: 7.3 G/DL (ref 6.4–8.2)
URINE TYPE: NORMAL
UROBILINOGEN, URINE: 1 E.U./DL
WBC # BLD: 6.9 K/UL (ref 4–11)

## 2019-07-23 PROCEDURE — 99285 EMERGENCY DEPT VISIT HI MDM: CPT

## 2019-07-23 PROCEDURE — 85025 COMPLETE CBC W/AUTO DIFF WBC: CPT

## 2019-07-23 PROCEDURE — 80076 HEPATIC FUNCTION PANEL: CPT

## 2019-07-23 PROCEDURE — 81003 URINALYSIS AUTO W/O SCOPE: CPT

## 2019-07-23 PROCEDURE — 80048 BASIC METABOLIC PNL TOTAL CA: CPT

## 2019-07-23 ASSESSMENT — PAIN DESCRIPTION - DESCRIPTORS: DESCRIPTORS: ACHING

## 2019-07-23 ASSESSMENT — PAIN DESCRIPTION - LOCATION: LOCATION: ABDOMEN

## 2019-07-23 ASSESSMENT — PAIN - FUNCTIONAL ASSESSMENT: PAIN_FUNCTIONAL_ASSESSMENT: PREVENTS OR INTERFERES SOME ACTIVE ACTIVITIES AND ADLS

## 2019-07-23 ASSESSMENT — PAIN DESCRIPTION - ONSET: ONSET: ON-GOING

## 2019-07-23 ASSESSMENT — PAIN DESCRIPTION - FREQUENCY: FREQUENCY: CONTINUOUS

## 2019-07-23 ASSESSMENT — PAIN SCALES - GENERAL: PAINLEVEL_OUTOF10: 4

## 2019-07-23 ASSESSMENT — PATIENT HEALTH QUESTIONNAIRE - PHQ9: SUM OF ALL RESPONSES TO PHQ QUESTIONS 1-9: 13

## 2019-07-23 ASSESSMENT — PAIN DESCRIPTION - PAIN TYPE: TYPE: ACUTE PAIN

## 2019-07-23 ASSESSMENT — PAIN DESCRIPTION - PROGRESSION: CLINICAL_PROGRESSION: NOT CHANGED

## 2019-07-23 ASSESSMENT — PAIN DESCRIPTION - ORIENTATION: ORIENTATION: LEFT

## 2019-07-23 NOTE — ED NOTES
Bed: [facility]  Expected date:   Expected time:   Means of arrival:   Comments:  Christopher 53 y/o M liver pt.  Jaundiced, VSS, abdomen distended     Gabbie Ramey RN  07/23/19 3904

## 2019-07-23 NOTE — ED TRIAGE NOTES
Pt came into ED via [facility]. Pt stated that he was told by a by a nurse at the [facility] that he needed to come to the hospital. Pt stated that he is in pain in his L side of abdomen. Stools are white and urine is orange. Eyes are yellow in color. Pt has hx of hep B. Pt states he is progressively feeling weak.

## 2019-07-29 NOTE — ED PROVIDER NOTES
[address] [facility] [address] ENCOUNTER          NURSE PRACTITIONER NOTE     Date of evaluation: 7/23/2019    Chief Complaint     Extremity Weakness ([facility] aid to come to ED, Liver failure)      History of Present Illness     Parker Maker is a 52 y.o. male with a recent history of acute hep B, remote lacunar infarcts, and a PMH that inidicates meth and cocaine abuse who presents to the ED with complaints of generalized weakness. This has been going on for the past several weeks and is not really changed. He relates that he has been \"bouning around\" from hospital to hospital after stay at [facility] from 6/20-6/26 at which point he was diagnosed with hep B. He tells me that he doesn't know how he got it. He relates that he has never done IV drugs. He reports he was \"yellow as a highlighter\" but that he is steadily improving. He reports persistant generalized abdominal cramping, no F/C/N/V/D. He is here today because one of the nurses at the mobile unit today told him he should be evaluated. He relates that is really needs help. He feels depressed, but denies SI/HI. He is homeless and feels like he has no where to go. With the exception of the above, there are no aggravating or alleviating factors. Review of Systems     Pertinent positive and negative findings as documented above in HPI, otherwise all other systems were reviewed and negative     Past Medical, Surgical, Family, and Social History     Medical History:   Past Medical History:   Diagnosis Date    Acute hepatitis B 6/21/2019    Anxiety     Asthma     Cerebral artery occlusion with cerebral infarction [facility])     Small bilateral remote lacunar infarcts 6/2019 MRI    History of cocaine use 2017    Homeless 6/21/2019    Hypertension     [facility] [address]     MI (myocardial infarction) ([facility] [address]     13-14 years ago       Surgical History: History reviewed. No pertinent surgical history.     Social History: He pending. Oncoming provider responsibilities include following up on pending labs/tests, reassessing patient, and appropriate disposition. Please see medical record for further management and medical decision making. I anticipate dispo home/back to shelter when medically appropriate      This patient was also evaluated by the attending physician, Dr. Jose D Tam. All care plans were discussed and agreed upon.          Phyllis Pak, WILLY - CNP  07/29/19 0273

## 2020-04-15 ENCOUNTER — HOSPITAL ENCOUNTER (EMERGENCY)
Age: 50
Discharge: HOME | End: 2020-04-15
Payer: COMMERCIAL

## 2020-04-15 VITALS
DIASTOLIC BLOOD PRESSURE: 100 MMHG | TEMPERATURE: 98.1 F | WEIGHT: 190 LBS | OXYGEN SATURATION: 99 % | RESPIRATION RATE: 18 BRPM | HEART RATE: 69 BPM | BODY MASS INDEX: 28.14 KG/M2 | SYSTOLIC BLOOD PRESSURE: 168 MMHG | HEIGHT: 69 IN

## 2020-04-15 PROCEDURE — 99283 EMERGENCY DEPT VISIT LOW MDM: CPT

## 2020-04-15 PROCEDURE — 6370000000 HC RX 637 (ALT 250 FOR IP): Performed by: NURSE PRACTITIONER

## 2020-04-15 RX ORDER — PREDNISONE 10 MG/1
TABLET ORAL
Qty: 44 TABLET | Refills: 0 | Status: SHIPPED | OUTPATIENT
Start: 2020-04-15 | End: 2020-04-25

## 2020-04-15 RX ORDER — CYCLOBENZAPRINE HCL 10 MG
10 TABLET ORAL ONCE
Status: COMPLETED | OUTPATIENT
Start: 2020-04-15 | End: 2020-04-15

## 2020-04-15 RX ORDER — CYCLOBENZAPRINE HCL 10 MG
10 TABLET ORAL 3 TIMES DAILY PRN
Qty: 20 TABLET | Refills: 0 | Status: SHIPPED | OUTPATIENT
Start: 2020-04-15 | End: 2020-04-22

## 2020-04-15 RX ORDER — NAPROXEN 250 MG/1
500 TABLET ORAL ONCE
Status: COMPLETED | OUTPATIENT
Start: 2020-04-15 | End: 2020-04-15

## 2020-04-15 RX ORDER — PREDNISONE 20 MG/1
60 TABLET ORAL ONCE
Status: COMPLETED | OUTPATIENT
Start: 2020-04-15 | End: 2020-04-15

## 2020-04-15 RX ORDER — NAPROXEN 500 MG/1
500 TABLET ORAL 2 TIMES DAILY WITH MEALS
Qty: 30 TABLET | Refills: 0 | Status: SHIPPED | OUTPATIENT
Start: 2020-04-15

## 2020-04-15 RX ORDER — LIDOCAINE 4 G/G
1 PATCH TOPICAL ONCE
Status: DISCONTINUED | OUTPATIENT
Start: 2020-04-15 | End: 2020-04-15 | Stop reason: HOSPADM

## 2020-04-15 RX ADMIN — NAPROXEN 500 MG: 250 TABLET ORAL at 07:55

## 2020-04-15 RX ADMIN — PREDNISONE 60 MG: 20 TABLET ORAL at 07:55

## 2020-04-15 RX ADMIN — CYCLOBENZAPRINE HYDROCHLORIDE 10 MG: 10 TABLET, FILM COATED ORAL at 07:55

## 2020-04-15 ASSESSMENT — ENCOUNTER SYMPTOMS
NAUSEA: 0
DIARRHEA: 0
SHORTNESS OF BREATH: 0
ABDOMINAL PAIN: 0
BLOOD IN STOOL: 0
BACK PAIN: 1
RHINORRHEA: 0
SORE THROAT: 0
CONSTIPATION: 0
VOMITING: 0

## 2020-04-15 ASSESSMENT — PAIN SCALES - GENERAL
PAINLEVEL_OUTOF10: 0

## 2020-04-15 NOTE — ED NOTES
Pt denies pain, pt ate 100% of diet tray. No s/s of distress, will get a ride for this pt back to the homeless shelter, [facility].      Pérez Gilman RN  04/15/20 6789

## 2020-04-15 NOTE — ED PROVIDER NOTES
 Alcohol use: Never     Frequency: Never    Drug use: Yes     Types: Marijuana     Comment: occasionally    Sexual activity: Yes     Partners: Female   Lifestyle    Physical activity     Days per week: Not on file     Minutes per session: Not on file    Stress: Not on file   Relationships    Social connections     Talks on phone: Not on file     Gets together: Not on file     Attends Religion service: Not on file     Active member of club or organization: Not on file     Attends meetings of clubs or organizations: Not on file     Relationship status: Not on file    Intimate partner violence     Fear of current or ex partner: Not on file     Emotionally abused: Not on file     Physically abused: Not on file     Forced sexual activity: Not on file   Other Topics Concern    Not on file   Social History Narrative    Not on file       SCREENINGS             PHYSICAL EXAM  (up to 7 for level 4, 8 or more for level 5)     ED Triage Vitals [04/15/20 0657]   BP Temp Temp Source Pulse Resp SpO2 Height Weight   (!) 168/100 98.1 °F (36.7 °C) Oral 69 18 99 % 5' 9\" (1.753 m) 190 lb (86.2 kg)       Physical Exam  Vitals signs and nursing note reviewed. Constitutional:       General: He is not in acute distress. Appearance: Normal appearance. He is well-developed. He is not diaphoretic. HENT:      Head: Normocephalic and atraumatic. Right Ear: External ear normal.      Left Ear: External ear normal.      Nose: Nose normal.   Eyes:      General:         Right eye: No discharge. Left eye: No discharge. Neck:      Musculoskeletal: Normal range of motion and neck supple. Cardiovascular:      Rate and Rhythm: Normal rate and regular rhythm. Heart sounds: Normal heart sounds. No murmur. No friction rub. No gallop. Pulmonary:      Effort: Pulmonary effort is normal. No respiratory distress. Breath sounds: Normal breath sounds. No stridor. No wheezing or rales.    Chest:      Chest wall: No tablet by mouth 3 times daily as needed for Muscle spasms    NAPROXEN (NAPROSYN) 500 MG TABLET    Take 1 tablet by mouth 2 times daily (with meals)    PREDNISONE (DELTASONE) 10 MG TABLET    60 mg po x 5 days then   40 mg po x 2 days then  20 mg po x 2 days then  10 mg po x 2 days total of 11 days    . Instructed to follow-up with:  [address] [facility]  Call in 1 day  to schedule an appointment with a new primary care provider    Return to the ER for new or worsening symptoms. This plan was discussed with the patient and all family available in the room at discharge who are all in agreement with the plan. I evaluated the patient. The physician was available for consultation as needed. The patient and / or the family were informed of the results of any tests, a time was given to answer questions, a plan was proposed and they agreed with plan. FINAL IMPRESSION      1. Sciatica of right side    2.  Homelessness          DISPOSITION/PLAN   DISPOSITION Decision To Discharge 04/15/2020 07:24:57 AM        DISCONTINUED MEDICATIONS:  Discontinued Medications    No medications on file                (Please note that portions of this note were completed with a voice recognition program.  Efforts were made to edit the dictations but occasionally words are mis-transcribed.)    WILLY Collins CNP (electronically signed)        WILLY Collins CNP  04/15/20 0800

## 2020-10-01 ENCOUNTER — OFFICE VISIT (OUTPATIENT)
Dept: INTERNAL MEDICINE | Facility: CLINIC | Age: 50
End: 2020-10-01

## 2020-10-01 VITALS
BODY MASS INDEX: 33.18 KG/M2 | WEIGHT: 211.4 LBS | HEART RATE: 57 BPM | DIASTOLIC BLOOD PRESSURE: 78 MMHG | TEMPERATURE: 97.3 F | HEIGHT: 67 IN | SYSTOLIC BLOOD PRESSURE: 132 MMHG | OXYGEN SATURATION: 98 %

## 2020-10-01 DIAGNOSIS — E78.2 MIXED HYPERLIPIDEMIA: ICD-10-CM

## 2020-10-01 DIAGNOSIS — Z29.9 PREVENTIVE MEASURE: ICD-10-CM

## 2020-10-01 DIAGNOSIS — G89.29 CHRONIC RIGHT-SIDED LOW BACK PAIN WITH RIGHT-SIDED SCIATICA: ICD-10-CM

## 2020-10-01 DIAGNOSIS — G25.81 RLS (RESTLESS LEGS SYNDROME): ICD-10-CM

## 2020-10-01 DIAGNOSIS — M54.41 CHRONIC RIGHT-SIDED LOW BACK PAIN WITH RIGHT-SIDED SCIATICA: ICD-10-CM

## 2020-10-01 DIAGNOSIS — F33.41 MAJOR DEPRESSIVE DISORDER, RECURRENT EPISODE, IN PARTIAL REMISSION (HCC): ICD-10-CM

## 2020-10-01 DIAGNOSIS — I10 ESSENTIAL HYPERTENSION: Primary | ICD-10-CM

## 2020-10-01 DIAGNOSIS — M54.10 RADICULAR PAIN OF RIGHT LOWER EXTREMITY: ICD-10-CM

## 2020-10-01 PROCEDURE — 99204 OFFICE O/P NEW MOD 45 MIN: CPT | Performed by: FAMILY MEDICINE

## 2020-10-01 RX ORDER — ROPINIROLE 0.25 MG/1
1 TABLET, FILM COATED ORAL DAILY
COMMUNITY
Start: 2020-08-28 | End: 2020-10-01 | Stop reason: SDUPTHER

## 2020-10-01 RX ORDER — SERTRALINE HYDROCHLORIDE 100 MG/1
100 TABLET, FILM COATED ORAL DAILY
Qty: 90 TABLET | Refills: 1 | Status: ON HOLD | OUTPATIENT
Start: 2020-10-01 | End: 2021-04-25

## 2020-10-01 RX ORDER — ATORVASTATIN CALCIUM 10 MG/1
1 TABLET, FILM COATED ORAL DAILY
COMMUNITY
Start: 2020-08-28 | End: 2020-10-01 | Stop reason: SDUPTHER

## 2020-10-01 RX ORDER — LISINOPRIL 10 MG/1
1 TABLET ORAL DAILY
COMMUNITY
Start: 2020-08-28 | End: 2020-10-01 | Stop reason: SDUPTHER

## 2020-10-01 RX ORDER — LISINOPRIL 10 MG/1
10 TABLET ORAL DAILY
Qty: 90 TABLET | Refills: 1 | Status: ON HOLD | OUTPATIENT
Start: 2020-10-01 | End: 2021-04-25

## 2020-10-01 RX ORDER — ATORVASTATIN CALCIUM 10 MG/1
10 TABLET, FILM COATED ORAL DAILY
Qty: 90 TABLET | Refills: 1 | Status: ON HOLD | OUTPATIENT
Start: 2020-10-01 | End: 2021-04-25

## 2020-10-01 RX ORDER — ROPINIROLE 0.25 MG/1
0.25 TABLET, FILM COATED ORAL DAILY
Qty: 90 TABLET | Refills: 1 | Status: ON HOLD | OUTPATIENT
Start: 2020-10-01 | End: 2021-04-25

## 2020-10-01 RX ORDER — SERTRALINE HYDROCHLORIDE 100 MG/1
1 TABLET, FILM COATED ORAL DAILY
COMMUNITY
Start: 2020-09-18 | End: 2020-10-01 | Stop reason: SDUPTHER

## 2020-10-01 RX ORDER — METHYLPREDNISOLONE 4 MG/1
TABLET ORAL
Qty: 21 EACH | Refills: 0 | Status: ON HOLD | OUTPATIENT
Start: 2020-10-01 | End: 2021-04-25

## 2020-10-01 RX ORDER — QUETIAPINE FUMARATE 100 MG/1
150 TABLET, FILM COATED ORAL NIGHTLY
Status: ON HOLD | COMMUNITY
Start: 2020-09-17 | End: 2021-04-25

## 2020-10-01 RX ORDER — ASPIRIN 81 MG/1
81 TABLET ORAL DAILY
Qty: 90 TABLET | Refills: 3 | Status: SHIPPED | OUTPATIENT
Start: 2020-10-01

## 2020-10-01 NOTE — PROGRESS NOTES
Subjective     Devonte Ambrosio is a 50 y.o. male.     Chief Complaint   Patient presents with   • Establish Care     new patient   • Leg Pain     shoots down right leg from lower back on right side, leg burns   • Med Refill   • Hyperlipidemia   • Hypertension   • Depression     at the [facility] was started on zoloft, serequel and hydroxyzine.  He is out of the hydroxyzine.       History of Present Illness     Chronic Lower back pain with Burning sensation of the Rt leg for > 2 months , pain 5- 8/10, on/off worse with long standing with difficulty in walking, radiate to Rt foot with Rt leg numbness .  Recent flare up started 4 days ago with pain 9/10 mainly on Rt side of lower back that shoots to Rt leg.  No BB sx   No h/o fall or direct trauma.       HTN with h/o heart attack in 2009  -This is a chronic problem.   -home blood pressure readings: no BP check at home   -salt intake : low sodium diet   -associated signs and symptoms: none  -BP is well controlled.   -No S/E reported from current Rx   -medication compliance: taking as prescribed  -Denies  blurred vision, chest pain, neck pain, orthopnea or shortness of breath.  Not on daily ASA     Hyperlipidemia  This is a chronic problem.   Eating none healthy , not doing daily exercise   Pertinent negatives include no chest pain or shortness of breath.   Current antihyperlipidemic treatment includes statins.    Risk factors for coronary artery disease include dyslipidemia.     CHRONIC Anxiety and depression for many years , doing well with Zoloft, SEROQUEL AND hydroxyzine.   Following  with the [facility].   No S/E reported   No SI/SA      RLS; c/o chronic legs jerky movement specially at night that bothers him a lot , sx worse after he starts taking Seroquel.   Currently doing better with Rx. No S/E reported.     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      The following portions of the patient's history were reviewed and  updated as appropriate: allergies, current medications, past family history, past medical history, past social history, past surgical history and problem list.    Review of Systems   Constitutional: Negative for activity change, appetite change, chills and fever.   HENT: Negative for congestion, ear discharge, ear pain, rhinorrhea, sore throat and trouble swallowing.    Eyes: Negative for discharge and visual disturbance.   Respiratory: Negative for apnea, cough, chest tightness, shortness of breath, wheezing and stridor.    Cardiovascular: Negative for chest pain, palpitations and leg swelling.   Gastrointestinal: Negative for abdominal distention, abdominal pain, nausea and vomiting.   Genitourinary: Negative for decreased urine volume, difficulty urinating, dysuria, flank pain, frequency, hematuria, penile pain and urinary incontinence.   Musculoskeletal: Positive for arthralgias, back pain, gait problem and myalgias. Negative for joint swelling, neck pain and neck stiffness.   Skin: Negative for color change, pallor and rash.   Neurological: Positive for numbness. Negative for dizziness, tremors, seizures, syncope, speech difficulty, weakness, light-headedness, headache and confusion.   Hematological: Does not bruise/bleed easily.   Psychiatric/Behavioral: Positive for sleep disturbance. Negative for agitation, behavioral problems, decreased concentration, suicidal ideas and stress. The patient is nervous/anxious.    All other systems reviewed and are negative.      Vitals:    10/01/20 1242   BP: 132/78   Pulse: 57   Temp: 97.3 °F (36.3 °C)   SpO2: 98%           10/01/20  1242   Weight: 95.9 kg (211 lb 6.4 oz)         Body mass index is 32.91 kg/m².      Current Outpatient Medications   Medication Sig Dispense Refill   • atorvastatin (LIPITOR) 10 MG tablet Take 1 tablet by mouth Daily. 90 tablet 1   • lisinopril (PRINIVIL,ZESTRIL) 10 MG tablet Take 1 tablet by mouth Daily. 90 tablet 1   • QUEtiapine (SEROquel)  100 MG tablet 150 mg Every Night.     • rOPINIRole (REQUIP) 0.25 MG tablet Take 1 tablet by mouth Daily. 90 tablet 1   • sertraline (ZOLOFT) 100 MG tablet Take 1 tablet by mouth Daily. 90 tablet 1   • aspirin (aspirin) 81 MG EC tablet Take 1 tablet by mouth Daily. 90 tablet 3   • diclofenac (VOLTAREN) 50 MG EC tablet Take 1 tablet by mouth 2 (Two) Times a Day. 40 tablet 0   • methylPREDNISolone (MEDROL) 4 MG dose pack Take as directed on package instructions. 21 each 0     No current facility-administered medications for this visit.                 Objective   Physical Exam  Vitals signs and nursing note reviewed.   Constitutional:       General: He is not in acute distress.     Appearance: He is well-developed. He is obese. He is not ill-appearing, toxic-appearing or diaphoretic.   HENT:      Head: Normocephalic.      Mouth/Throat:      Mouth: Mucous membranes are moist.      Pharynx: Oropharynx is clear.   Eyes:      General: No scleral icterus.     Conjunctiva/sclera: Conjunctivae normal.   Neck:      Musculoskeletal: Neck supple.      Thyroid: No thyromegaly.   Cardiovascular:      Rate and Rhythm: Normal rate and regular rhythm.      Heart sounds: Normal heart sounds. No murmur. No friction rub. No gallop.    Pulmonary:      Effort: Pulmonary effort is normal. No respiratory distress.      Breath sounds: Normal breath sounds. No stridor. No wheezing or rhonchi.   Abdominal:      General: Bowel sounds are normal. There is no distension.      Palpations: Abdomen is soft. There is no mass.      Tenderness: There is no abdominal tenderness. There is no guarding or rebound.      Hernia: No hernia is present.   Musculoskeletal: Normal range of motion.         General: Tenderness present. No swelling.      Right lower leg: No edema.      Left lower leg: No edema.      Comments: Mild TTP over L spines   Mild -modera limitation in ROM 2/2 pain    Skin:     General: Skin is warm.      Coloration: Skin is not jaundiced or  pale.      Findings: No erythema or rash.   Neurological:      General: No focal deficit present.      Mental Status: He is alert and oriented to person, place, and time.      Cranial Nerves: No cranial nerve deficit.      Sensory: No sensory deficit.      Motor: No weakness or abnormal muscle tone.      Coordination: Coordination normal.      Gait: Gait abnormal.      Deep Tendon Reflexes: Reflexes normal.   Psychiatric:         Mood and Affect: Mood normal.         Behavior: Behavior normal.         Thought Content: Thought content normal.         Judgment: Judgment normal.           Assessment/Plan   Devonte was seen today for establish care, leg pain, med refill, hyperlipidemia, hypertension and depression.    Diagnoses and all orders for this visit:    Essential hypertension;  - Well controlled , will continue current Rx  -     lisinopril (PRINIVIL,ZESTRIL) 10 MG tablet; Take 1 tablet by mouth Daily.  Plan ;  Encouraged patient to maintain a heart healthy diet/DASH diet, exercise as tolerated, limit sodium intake to no more than 1,500mg/day, limit alcohol intake, maintain medication compliance and practice relaxation techniques to reduce stress. To ER for chest pain or signs/symptoms of stroke.     Mixed hyperlipidemia  -     atorvastatin (LIPITOR) 10 MG tablet; Take 1 tablet by mouth Daily.  -  Encouraged patient to maintain a low cholesterol/DASH diet, increase aerobic exercise as tolerated, decrease alcohol, stop smoking if applicable, increase fiber intake, limit sodium intake to no more than 1,500mg/day, increase omega-3 fatty acids, and maintain medication compliance. Will recheck lipid panel next visit.    Major depressive disorder, recurrent episode, in partial remission (CMS/HCC);  - Stable, continue current Rx and f/u with [facility]   -     sertraline (ZOLOFT) 100 MG tablet; Take 1 tablet by mouth Daily.    RLS (restless legs syndrome)  - Stable, continue current Rx   -     rOPINIRole (REQUIP) 0.25 MG  tablet; Take 1 tablet by mouth Daily.    Radicular pain of right lower extremity  -     MRI Lumbar Spine Without Contrast; Future  -     methylPREDNISolone (MEDROL) 4 MG dose pack; Take as directed on package instructions.  -     diclofenac (VOLTAREN) 50 MG EC tablet; Take 1 tablet by mouth 2 (Two) Times a Day.    Chronic right-sided low back pain with right-sided sciatica  -     MRI Lumbar Spine Without Contrast; Future  -     methylPREDNISolone (MEDROL) 4 MG dose pack; Take as directed on package instructions.  -     diclofenac (VOLTAREN) 50 MG EC tablet; Take 1 tablet by mouth 2 (Two) Times a Day.    Preventive measure  -     aspirin (aspirin) 81 MG EC tablet; Take 1 tablet by mouth Daily.      I have fully discussed the nature of the medical condition(s) risks, complications, management, safe and proper use of medications.   I have discussed the SIDE EFFECT OF MEDICATION and importance TO report any side effect , the patient expressed good understanding.  Encouraged medication compliance and the importance of keeping scheduled follow up appointments with me and any other providers.    Patient instructed to follow up with our office for results on any labs/imaging ordered during this visit.    Home care discussed  Screening for Depression done , please see [facility] PHQ 2/9  All questions answered  Patient verbalizes understanding and agrees to treatment plan.

## 2020-10-21 ENCOUNTER — HOSPITAL ENCOUNTER (OUTPATIENT)
Dept: MRI IMAGING | Facility: HOSPITAL | Age: 50
Discharge: HOME | End: 2020-10-21
Admitting: FAMILY MEDICINE

## 2020-10-21 DIAGNOSIS — M54.41 CHRONIC RIGHT-SIDED LOW BACK PAIN WITH RIGHT-SIDED SCIATICA: ICD-10-CM

## 2020-10-21 DIAGNOSIS — M54.10 RADICULAR PAIN OF RIGHT LOWER EXTREMITY: ICD-10-CM

## 2020-10-21 DIAGNOSIS — G89.29 CHRONIC RIGHT-SIDED LOW BACK PAIN WITH RIGHT-SIDED SCIATICA: ICD-10-CM

## 2020-10-21 PROCEDURE — 72148 MRI LUMBAR SPINE W/O DYE: CPT

## 2020-10-26 DIAGNOSIS — G89.29 CHRONIC RIGHT-SIDED LOW BACK PAIN WITH RIGHT-SIDED SCIATICA: ICD-10-CM

## 2020-10-26 DIAGNOSIS — M54.10 RADICULAR PAIN OF RIGHT LOWER EXTREMITY: Primary | ICD-10-CM

## 2020-10-26 DIAGNOSIS — M54.41 CHRONIC RIGHT-SIDED LOW BACK PAIN WITH RIGHT-SIDED SCIATICA: ICD-10-CM

## 2020-10-27 ENCOUNTER — TELEPHONE (OUTPATIENT)
Dept: INTERNAL MEDICINE | Facility: CLINIC | Age: 50
End: 2020-10-27

## 2020-10-27 NOTE — TELEPHONE ENCOUNTER
----- Message from Fanny Booth MD sent at 10/26/2020  8:23 AM EDT -----  PLEASE call for MRI results, he has Degenerative disc of lumber spine , referral to neurosurgery done    Fanny Booth MD

## 2021-04-25 ENCOUNTER — HOSPITAL ENCOUNTER (INPATIENT)
Facility: HOSPITAL | Age: 51
LOS: 2 days | Discharge: HOME | End: 2021-04-27
Attending: PSYCHIATRY & NEUROLOGY | Admitting: PSYCHIATRY & NEUROLOGY

## 2021-04-25 ENCOUNTER — HOSPITAL ENCOUNTER (EMERGENCY)
Facility: HOSPITAL | Age: 51
Discharge: PSYCHIATRIC OR SUBSTANCE USE FACILITY | End: 2021-04-25
Attending: EMERGENCY MEDICINE | Admitting: EMERGENCY MEDICINE

## 2021-04-25 VITALS
DIASTOLIC BLOOD PRESSURE: 97 MMHG | OXYGEN SATURATION: 98 % | WEIGHT: 215 LBS | TEMPERATURE: 98.9 F | SYSTOLIC BLOOD PRESSURE: 155 MMHG | BODY MASS INDEX: 32.58 KG/M2 | HEIGHT: 68 IN | HEART RATE: 88 BPM | RESPIRATION RATE: 19 BRPM

## 2021-04-25 DIAGNOSIS — F19.10 SUBSTANCE ABUSE (HCC): Primary | ICD-10-CM

## 2021-04-25 PROBLEM — F32.9 MDD (MAJOR DEPRESSIVE DISORDER): Status: ACTIVE | Noted: 2021-04-25

## 2021-04-25 LAB
6-ACETYL MORPHINE: NEGATIVE
ALBUMIN SERPL-MCNC: 4.78 G/DL (ref 3.5–5.2)
ALBUMIN/GLOB SERPL: 1.3 G/DL
ALP SERPL-CCNC: 92 U/L (ref 39–117)
ALT SERPL W P-5'-P-CCNC: 18 U/L (ref 1–41)
AMPHET+METHAMPHET UR QL: POSITIVE
ANION GAP SERPL CALCULATED.3IONS-SCNC: 13.4 MMOL/L (ref 5–15)
AST SERPL-CCNC: 22 U/L (ref 1–40)
BACTERIA UR QL AUTO: ABNORMAL /HPF
BARBITURATES UR QL SCN: NEGATIVE
BASOPHILS # BLD AUTO: 0.13 10*3/MM3 (ref 0–0.2)
BASOPHILS NFR BLD AUTO: 0.8 % (ref 0–1.5)
BENZODIAZ UR QL SCN: NEGATIVE
BILIRUB SERPL-MCNC: 0.7 MG/DL (ref 0–1.2)
BILIRUB UR QL STRIP: NEGATIVE
BUN SERPL-MCNC: 25 MG/DL (ref 6–20)
BUN/CREAT SERPL: 17.4 (ref 7–25)
BUPRENORPHINE SERPL-MCNC: NEGATIVE NG/ML
CALCIUM SPEC-SCNC: 10.2 MG/DL (ref 8.6–10.5)
CANNABINOIDS SERPL QL: NEGATIVE
CHLORIDE SERPL-SCNC: 98 MMOL/L (ref 98–107)
CLARITY UR: ABNORMAL
CO2 SERPL-SCNC: 25.6 MMOL/L (ref 22–29)
COCAINE UR QL: NEGATIVE
COLOR UR: ABNORMAL
CREAT SERPL-MCNC: 1.44 MG/DL (ref 0.76–1.27)
DEPRECATED RDW RBC AUTO: 47.8 FL (ref 37–54)
EOSINOPHIL # BLD AUTO: 0.31 10*3/MM3 (ref 0–0.4)
EOSINOPHIL NFR BLD AUTO: 2 % (ref 0.3–6.2)
ERYTHROCYTE [DISTWIDTH] IN BLOOD BY AUTOMATED COUNT: 13.7 % (ref 12.3–15.4)
ETHANOL BLD-MCNC: <10 MG/DL (ref 0–10)
ETHANOL UR QL: <0.01 %
FLUAV RNA RESP QL NAA+PROBE: NOT DETECTED
FLUBV RNA RESP QL NAA+PROBE: NOT DETECTED
GFR SERPL CREATININE-BSD FRML MDRD: 52 ML/MIN/1.73
GLOBULIN UR ELPH-MCNC: 3.8 GM/DL
GLUCOSE SERPL-MCNC: 120 MG/DL (ref 65–99)
GLUCOSE UR STRIP-MCNC: NEGATIVE MG/DL
HCT VFR BLD AUTO: 52.6 % (ref 37.5–51)
HGB BLD-MCNC: 17.4 G/DL (ref 13–17.7)
HGB UR QL STRIP.AUTO: NEGATIVE
HYALINE CASTS UR QL AUTO: ABNORMAL /LPF
IMM GRANULOCYTES # BLD AUTO: 0.06 10*3/MM3 (ref 0–0.05)
IMM GRANULOCYTES NFR BLD AUTO: 0.4 % (ref 0–0.5)
KETONES UR QL STRIP: ABNORMAL
LEUKOCYTE ESTERASE UR QL STRIP.AUTO: ABNORMAL
LYMPHOCYTES # BLD AUTO: 4.24 10*3/MM3 (ref 0.7–3.1)
LYMPHOCYTES NFR BLD AUTO: 27.4 % (ref 19.6–45.3)
MAGNESIUM SERPL-MCNC: 2.1 MG/DL (ref 1.6–2.6)
MCH RBC QN AUTO: 31.1 PG (ref 26.6–33)
MCHC RBC AUTO-ENTMCNC: 33.1 G/DL (ref 31.5–35.7)
MCV RBC AUTO: 94.1 FL (ref 79–97)
METHADONE UR QL SCN: NEGATIVE
MONOCYTES # BLD AUTO: 1.59 10*3/MM3 (ref 0.1–0.9)
MONOCYTES NFR BLD AUTO: 10.3 % (ref 5–12)
MUCOUS THREADS URNS QL MICRO: ABNORMAL /HPF
NEUTROPHILS NFR BLD AUTO: 59.1 % (ref 42.7–76)
NEUTROPHILS NFR BLD AUTO: 9.13 10*3/MM3 (ref 1.7–7)
NITRITE UR QL STRIP: NEGATIVE
NRBC BLD AUTO-RTO: 0 /100 WBC (ref 0–0.2)
OPIATES UR QL: NEGATIVE
OXYCODONE UR QL SCN: NEGATIVE
PCP UR QL SCN: NEGATIVE
PH UR STRIP.AUTO: <=5 [PH] (ref 5–8)
PLATELET # BLD AUTO: 568 10*3/MM3 (ref 140–450)
PMV BLD AUTO: 8.7 FL (ref 6–12)
POTASSIUM SERPL-SCNC: 3.9 MMOL/L (ref 3.5–5.2)
PROT SERPL-MCNC: 8.6 G/DL (ref 6–8.5)
PROT UR QL STRIP: ABNORMAL
RBC # BLD AUTO: 5.59 10*6/MM3 (ref 4.14–5.8)
RBC # UR: ABNORMAL /HPF
REF LAB TEST METHOD: ABNORMAL
SARS-COV-2 RNA RESP QL NAA+PROBE: NOT DETECTED
SODIUM SERPL-SCNC: 137 MMOL/L (ref 136–145)
SP GR UR STRIP: 1.02 (ref 1–1.03)
SQUAMOUS #/AREA URNS HPF: ABNORMAL /HPF
UROBILINOGEN UR QL STRIP: ABNORMAL
WBC # BLD AUTO: 15.46 10*3/MM3 (ref 3.4–10.8)
WBC UR QL AUTO: ABNORMAL /HPF

## 2021-04-25 PROCEDURE — 83735 ASSAY OF MAGNESIUM: CPT | Performed by: EMERGENCY MEDICINE

## 2021-04-25 PROCEDURE — 87636 SARSCOV2 & INF A&B AMP PRB: CPT | Performed by: EMERGENCY MEDICINE

## 2021-04-25 PROCEDURE — 80053 COMPREHEN METABOLIC PANEL: CPT | Performed by: EMERGENCY MEDICINE

## 2021-04-25 PROCEDURE — 85025 COMPLETE CBC W/AUTO DIFF WBC: CPT | Performed by: EMERGENCY MEDICINE

## 2021-04-25 PROCEDURE — 80307 DRUG TEST PRSMV CHEM ANLYZR: CPT | Performed by: EMERGENCY MEDICINE

## 2021-04-25 PROCEDURE — 99284 EMERGENCY DEPT VISIT MOD MDM: CPT

## 2021-04-25 PROCEDURE — 81001 URINALYSIS AUTO W/SCOPE: CPT | Performed by: EMERGENCY MEDICINE

## 2021-04-25 PROCEDURE — 82077 ASSAY SPEC XCP UR&BREATH IA: CPT | Performed by: EMERGENCY MEDICINE

## 2021-04-25 PROCEDURE — C9803 HOPD COVID-19 SPEC COLLECT: HCPCS

## 2021-04-25 RX ORDER — IBUPROFEN 400 MG/1
400 TABLET ORAL EVERY 6 HOURS PRN
Status: DISCONTINUED | OUTPATIENT
Start: 2021-04-25 | End: 2021-04-27 | Stop reason: HOSPADM

## 2021-04-25 RX ORDER — BENZTROPINE MESYLATE 1 MG/1
2 TABLET ORAL ONCE AS NEEDED
Status: DISCONTINUED | OUTPATIENT
Start: 2021-04-25 | End: 2021-04-27 | Stop reason: HOSPADM

## 2021-04-25 RX ORDER — HYDROXYZINE 50 MG/1
50 TABLET, FILM COATED ORAL EVERY 6 HOURS PRN
Status: DISCONTINUED | OUTPATIENT
Start: 2021-04-25 | End: 2021-04-27 | Stop reason: HOSPADM

## 2021-04-25 RX ORDER — DIPHENHYDRAMINE HYDROCHLORIDE 50 MG/ML
50 INJECTION INTRAMUSCULAR; INTRAVENOUS EVERY 6 HOURS PRN
Status: DISCONTINUED | OUTPATIENT
Start: 2021-04-25 | End: 2021-04-27 | Stop reason: HOSPADM

## 2021-04-25 RX ORDER — FAMOTIDINE 20 MG/1
20 TABLET, FILM COATED ORAL 2 TIMES DAILY PRN
Status: DISCONTINUED | OUTPATIENT
Start: 2021-04-25 | End: 2021-04-27 | Stop reason: HOSPADM

## 2021-04-25 RX ORDER — PANTOPRAZOLE SODIUM 40 MG/1
40 TABLET, DELAYED RELEASE ORAL DAILY
Status: DISCONTINUED | OUTPATIENT
Start: 2021-04-26 | End: 2021-04-27 | Stop reason: HOSPADM

## 2021-04-25 RX ORDER — ONDANSETRON 4 MG/1
4 TABLET, FILM COATED ORAL EVERY 6 HOURS PRN
Status: DISCONTINUED | OUTPATIENT
Start: 2021-04-25 | End: 2021-04-27 | Stop reason: HOSPADM

## 2021-04-25 RX ORDER — LISINOPRIL 10 MG/1
20 TABLET ORAL DAILY
Status: DISCONTINUED | OUTPATIENT
Start: 2021-04-26 | End: 2021-04-27 | Stop reason: HOSPADM

## 2021-04-25 RX ORDER — BENZTROPINE MESYLATE 1 MG/ML
1 INJECTION INTRAMUSCULAR; INTRAVENOUS ONCE AS NEEDED
Status: DISCONTINUED | OUTPATIENT
Start: 2021-04-25 | End: 2021-04-27 | Stop reason: HOSPADM

## 2021-04-25 RX ORDER — NICOTINE 21 MG/24HR
1 PATCH, TRANSDERMAL 24 HOURS TRANSDERMAL
Status: DISCONTINUED | OUTPATIENT
Start: 2021-04-26 | End: 2021-04-27 | Stop reason: HOSPADM

## 2021-04-25 RX ORDER — BENZONATATE 100 MG/1
100 CAPSULE ORAL 3 TIMES DAILY PRN
Status: DISCONTINUED | OUTPATIENT
Start: 2021-04-25 | End: 2021-04-27 | Stop reason: HOSPADM

## 2021-04-25 RX ORDER — ASPIRIN 81 MG/1
81 TABLET ORAL DAILY
Status: DISCONTINUED | OUTPATIENT
Start: 2021-04-26 | End: 2021-04-27 | Stop reason: HOSPADM

## 2021-04-25 RX ORDER — ECHINACEA PURPUREA EXTRACT 125 MG
2 TABLET ORAL AS NEEDED
Status: DISCONTINUED | OUTPATIENT
Start: 2021-04-25 | End: 2021-04-27 | Stop reason: HOSPADM

## 2021-04-25 RX ORDER — LISINOPRIL 20 MG/1
20 TABLET ORAL DAILY
COMMUNITY

## 2021-04-25 RX ORDER — LOPERAMIDE HYDROCHLORIDE 2 MG/1
2 CAPSULE ORAL
Status: DISCONTINUED | OUTPATIENT
Start: 2021-04-25 | End: 2021-04-27 | Stop reason: HOSPADM

## 2021-04-25 RX ORDER — LORAZEPAM 2 MG/1
2 TABLET ORAL EVERY 6 HOURS PRN
Status: DISCONTINUED | OUTPATIENT
Start: 2021-04-25 | End: 2021-04-27 | Stop reason: HOSPADM

## 2021-04-25 RX ORDER — HALOPERIDOL 5 MG/ML
5 INJECTION INTRAMUSCULAR EVERY 6 HOURS PRN
Status: DISCONTINUED | OUTPATIENT
Start: 2021-04-25 | End: 2021-04-27 | Stop reason: HOSPADM

## 2021-04-25 RX ORDER — ALUMINA, MAGNESIA, AND SIMETHICONE 2400; 2400; 240 MG/30ML; MG/30ML; MG/30ML
15 SUSPENSION ORAL EVERY 6 HOURS PRN
Status: DISCONTINUED | OUTPATIENT
Start: 2021-04-25 | End: 2021-04-27 | Stop reason: HOSPADM

## 2021-04-25 RX ORDER — TRAZODONE HYDROCHLORIDE 50 MG/1
50 TABLET ORAL NIGHTLY PRN
Status: DISCONTINUED | OUTPATIENT
Start: 2021-04-25 | End: 2021-04-27 | Stop reason: HOSPADM

## 2021-04-25 RX ORDER — LORAZEPAM 2 MG/ML
2 INJECTION INTRAMUSCULAR EVERY 6 HOURS PRN
Status: DISCONTINUED | OUTPATIENT
Start: 2021-04-25 | End: 2021-04-27 | Stop reason: HOSPADM

## 2021-04-25 RX ORDER — HALOPERIDOL 5 MG/1
5 TABLET ORAL EVERY 6 HOURS PRN
Status: DISCONTINUED | OUTPATIENT
Start: 2021-04-25 | End: 2021-04-27 | Stop reason: HOSPADM

## 2021-04-25 RX ORDER — DIPHENHYDRAMINE HCL 25 MG
50 CAPSULE ORAL EVERY 6 HOURS PRN
Status: DISCONTINUED | OUTPATIENT
Start: 2021-04-25 | End: 2021-04-27 | Stop reason: HOSPADM

## 2021-04-25 RX ORDER — ACETAMINOPHEN 325 MG/1
650 TABLET ORAL EVERY 6 HOURS PRN
Status: DISCONTINUED | OUTPATIENT
Start: 2021-04-25 | End: 2021-04-27 | Stop reason: HOSPADM

## 2021-04-26 PROBLEM — F33.8 OTHER RECURRENT DEPRESSIVE DISORDERS (HCC): Status: ACTIVE | Noted: 2021-04-25

## 2021-04-26 PROBLEM — F15.20 METHAMPHETAMINE USE DISORDER, SEVERE (HCC): Status: ACTIVE | Noted: 2021-04-26

## 2021-04-26 PROBLEM — F17.200 NICOTINE USE DISORDER: Status: ACTIVE | Noted: 2021-04-26

## 2021-04-26 PROCEDURE — 99223 1ST HOSP IP/OBS HIGH 75: CPT | Performed by: PSYCHIATRY & NEUROLOGY

## 2021-04-26 PROCEDURE — 93005 ELECTROCARDIOGRAM TRACING: CPT | Performed by: PSYCHIATRY & NEUROLOGY

## 2021-04-26 PROCEDURE — 93010 ELECTROCARDIOGRAM REPORT: CPT | Performed by: INTERNAL MEDICINE

## 2021-04-26 RX ADMIN — TRAZODONE HYDROCHLORIDE 50 MG: 50 TABLET ORAL at 21:56

## 2021-04-26 RX ADMIN — SERTRALINE 50 MG: 50 TABLET, FILM COATED ORAL at 10:18

## 2021-04-26 RX ADMIN — Medication 1 PATCH: at 14:16

## 2021-04-26 RX ADMIN — ASPIRIN 81 MG: 81 TABLET, COATED ORAL at 10:18

## 2021-04-26 RX ADMIN — PANTOPRAZOLE SODIUM 40 MG: 40 TABLET, DELAYED RELEASE ORAL at 10:18

## 2021-04-27 VITALS
RESPIRATION RATE: 18 BRPM | HEIGHT: 68 IN | WEIGHT: 203.4 LBS | DIASTOLIC BLOOD PRESSURE: 52 MMHG | HEART RATE: 56 BPM | TEMPERATURE: 98.1 F | BODY MASS INDEX: 30.83 KG/M2 | OXYGEN SATURATION: 97 % | SYSTOLIC BLOOD PRESSURE: 84 MMHG

## 2021-04-27 PROCEDURE — 99238 HOSP IP/OBS DSCHRG MGMT 30/<: CPT | Performed by: PSYCHIATRY & NEUROLOGY

## 2021-04-27 RX ADMIN — LISINOPRIL 20 MG: 10 TABLET ORAL at 08:42

## 2021-04-27 RX ADMIN — PANTOPRAZOLE SODIUM 40 MG: 40 TABLET, DELAYED RELEASE ORAL at 08:42

## 2021-04-27 RX ADMIN — Medication 1 PATCH: at 08:42

## 2021-04-27 RX ADMIN — SERTRALINE 50 MG: 50 TABLET, FILM COATED ORAL at 08:42

## 2021-04-27 RX ADMIN — ASPIRIN 81 MG: 81 TABLET, COATED ORAL at 08:42

## 2021-04-28 LAB
QT INTERVAL: 432 MS
QTC INTERVAL: 434 MS

## 2021-06-12 ENCOUNTER — HOSPITAL ENCOUNTER (OUTPATIENT)
Facility: HOSPITAL | Age: 51
Setting detail: OBSERVATION
Discharge: HOME | End: 2021-06-13
Attending: EMERGENCY MEDICINE | Admitting: HOSPITALIST

## 2021-06-12 ENCOUNTER — APPOINTMENT (OUTPATIENT)
Dept: GENERAL RADIOLOGY | Facility: HOSPITAL | Age: 51
End: 2021-06-12

## 2021-06-12 DIAGNOSIS — N17.9 ACUTE KIDNEY INJURY (HCC): Primary | ICD-10-CM

## 2021-06-12 DIAGNOSIS — R07.9 LEFT-SIDED CHEST PAIN: ICD-10-CM

## 2021-06-12 DIAGNOSIS — F41.9 ANXIETY: ICD-10-CM

## 2021-06-12 DIAGNOSIS — F15.10 METHAMPHETAMINE ABUSE (HCC): ICD-10-CM

## 2021-06-12 LAB
ALBUMIN SERPL-MCNC: 5 G/DL (ref 3.5–5.2)
ALBUMIN/GLOB SERPL: 1.4 G/DL
ALP SERPL-CCNC: 74 U/L (ref 39–117)
ALT SERPL W P-5'-P-CCNC: 17 U/L (ref 1–41)
AMPHET+METHAMPHET UR QL: POSITIVE
AMPHETAMINES UR QL: POSITIVE
ANION GAP SERPL CALCULATED.3IONS-SCNC: 18 MMOL/L (ref 5–15)
AST SERPL-CCNC: 21 U/L (ref 1–40)
BARBITURATES UR QL SCN: NEGATIVE
BASOPHILS # BLD AUTO: 0.11 10*3/MM3 (ref 0–0.2)
BASOPHILS NFR BLD AUTO: 0.7 % (ref 0–1.5)
BENZODIAZ UR QL SCN: NEGATIVE
BILIRUB SERPL-MCNC: 0.8 MG/DL (ref 0–1.2)
BUN SERPL-MCNC: 26 MG/DL (ref 6–20)
BUN/CREAT SERPL: 10.8 (ref 7–25)
BUPRENORPHINE SERPL-MCNC: NEGATIVE NG/ML
CALCIUM SPEC-SCNC: 10.1 MG/DL (ref 8.6–10.5)
CANNABINOIDS SERPL QL: POSITIVE
CHLORIDE SERPL-SCNC: 100 MMOL/L (ref 98–107)
CO2 SERPL-SCNC: 22 MMOL/L (ref 22–29)
COCAINE UR QL: NEGATIVE
CREAT SERPL-MCNC: 2.4 MG/DL (ref 0.76–1.27)
DEPRECATED RDW RBC AUTO: 47.5 FL (ref 37–54)
EOSINOPHIL # BLD AUTO: 0.14 10*3/MM3 (ref 0–0.4)
EOSINOPHIL NFR BLD AUTO: 0.9 % (ref 0.3–6.2)
ERYTHROCYTE [DISTWIDTH] IN BLOOD BY AUTOMATED COUNT: 13.9 % (ref 12.3–15.4)
ETHANOL BLD-MCNC: <10 MG/DL (ref 0–10)
ETHANOL UR QL: <0.01 %
GFR SERPL CREATININE-BSD FRML MDRD: 29 ML/MIN/1.73
GLOBULIN UR ELPH-MCNC: 3.5 GM/DL
GLUCOSE SERPL-MCNC: 122 MG/DL (ref 65–99)
HCT VFR BLD AUTO: 48.7 % (ref 37.5–51)
HGB BLD-MCNC: 16.7 G/DL (ref 13–17.7)
IMM GRANULOCYTES # BLD AUTO: 0.04 10*3/MM3 (ref 0–0.05)
IMM GRANULOCYTES NFR BLD AUTO: 0.3 % (ref 0–0.5)
LYMPHOCYTES # BLD AUTO: 2.72 10*3/MM3 (ref 0.7–3.1)
LYMPHOCYTES NFR BLD AUTO: 17.7 % (ref 19.6–45.3)
MAGNESIUM SERPL-MCNC: 2.4 MG/DL (ref 1.6–2.6)
MCH RBC QN AUTO: 31.9 PG (ref 26.6–33)
MCHC RBC AUTO-ENTMCNC: 34.3 G/DL (ref 31.5–35.7)
MCV RBC AUTO: 93.1 FL (ref 79–97)
METHADONE UR QL SCN: NEGATIVE
MONOCYTES # BLD AUTO: 1.51 10*3/MM3 (ref 0.1–0.9)
MONOCYTES NFR BLD AUTO: 9.8 % (ref 5–12)
NEUTROPHILS NFR BLD AUTO: 10.87 10*3/MM3 (ref 1.7–7)
NEUTROPHILS NFR BLD AUTO: 70.6 % (ref 42.7–76)
NRBC BLD AUTO-RTO: 0 /100 WBC (ref 0–0.2)
NT-PROBNP SERPL-MCNC: 930.5 PG/ML (ref 0–900)
OPIATES UR QL: NEGATIVE
OXYCODONE UR QL SCN: NEGATIVE
PCP UR QL SCN: NEGATIVE
PHOSPHATE SERPL-MCNC: 3.5 MG/DL (ref 2.5–4.5)
PLATELET # BLD AUTO: 490 10*3/MM3 (ref 140–450)
PMV BLD AUTO: 9.1 FL (ref 6–12)
POTASSIUM SERPL-SCNC: 3.4 MMOL/L (ref 3.5–5.2)
PROPOXYPH UR QL: NEGATIVE
PROT SERPL-MCNC: 8.5 G/DL (ref 6–8.5)
QT INTERVAL: 366 MS
RBC # BLD AUTO: 5.23 10*6/MM3 (ref 4.14–5.8)
SODIUM SERPL-SCNC: 140 MMOL/L (ref 136–145)
TRICYCLICS UR QL SCN: NEGATIVE
TROPONIN T SERPL-MCNC: <0.01 NG/ML (ref 0–0.03)
WBC # BLD AUTO: 15.39 10*3/MM3 (ref 3.4–10.8)

## 2021-06-12 PROCEDURE — G0378 HOSPITAL OBSERVATION PER HR: HCPCS

## 2021-06-12 PROCEDURE — 99219 PR INITIAL OBSERVATION CARE/DAY 50 MINUTES: CPT | Performed by: STUDENT IN AN ORGANIZED HEALTH CARE EDUCATION/TRAINING PROGRAM

## 2021-06-12 PROCEDURE — 99284 EMERGENCY DEPT VISIT MOD MDM: CPT

## 2021-06-12 PROCEDURE — 96361 HYDRATE IV INFUSION ADD-ON: CPT

## 2021-06-12 PROCEDURE — 82077 ASSAY SPEC XCP UR&BREATH IA: CPT | Performed by: PHYSICIAN ASSISTANT

## 2021-06-12 PROCEDURE — 96360 HYDRATION IV INFUSION INIT: CPT

## 2021-06-12 PROCEDURE — 84300 ASSAY OF URINE SODIUM: CPT | Performed by: STUDENT IN AN ORGANIZED HEALTH CARE EDUCATION/TRAINING PROGRAM

## 2021-06-12 PROCEDURE — 83735 ASSAY OF MAGNESIUM: CPT | Performed by: STUDENT IN AN ORGANIZED HEALTH CARE EDUCATION/TRAINING PROGRAM

## 2021-06-12 PROCEDURE — 82570 ASSAY OF URINE CREATININE: CPT | Performed by: STUDENT IN AN ORGANIZED HEALTH CARE EDUCATION/TRAINING PROGRAM

## 2021-06-12 PROCEDURE — 83880 ASSAY OF NATRIURETIC PEPTIDE: CPT | Performed by: PHYSICIAN ASSISTANT

## 2021-06-12 PROCEDURE — 80306 DRUG TEST PRSMV INSTRMNT: CPT | Performed by: PHYSICIAN ASSISTANT

## 2021-06-12 PROCEDURE — 85025 COMPLETE CBC W/AUTO DIFF WBC: CPT | Performed by: PHYSICIAN ASSISTANT

## 2021-06-12 PROCEDURE — 71046 X-RAY EXAM CHEST 2 VIEWS: CPT

## 2021-06-12 PROCEDURE — 99285 EMERGENCY DEPT VISIT HI MDM: CPT | Performed by: PHYSICIAN ASSISTANT

## 2021-06-12 PROCEDURE — 80053 COMPREHEN METABOLIC PANEL: CPT | Performed by: PHYSICIAN ASSISTANT

## 2021-06-12 PROCEDURE — 84484 ASSAY OF TROPONIN QUANT: CPT | Performed by: PHYSICIAN ASSISTANT

## 2021-06-12 PROCEDURE — 84100 ASSAY OF PHOSPHORUS: CPT | Performed by: STUDENT IN AN ORGANIZED HEALTH CARE EDUCATION/TRAINING PROGRAM

## 2021-06-12 PROCEDURE — 93005 ELECTROCARDIOGRAM TRACING: CPT | Performed by: EMERGENCY MEDICINE

## 2021-06-12 PROCEDURE — 93010 ELECTROCARDIOGRAM REPORT: CPT | Performed by: INTERNAL MEDICINE

## 2021-06-12 RX ORDER — SODIUM CHLORIDE 0.9 % (FLUSH) 0.9 %
10 SYRINGE (ML) INJECTION AS NEEDED
Status: DISCONTINUED | OUTPATIENT
Start: 2021-06-12 | End: 2021-06-13 | Stop reason: HOSPADM

## 2021-06-12 RX ORDER — LISINOPRIL 20 MG/1
20 TABLET ORAL DAILY
COMMUNITY
End: 2021-06-13 | Stop reason: HOSPADM

## 2021-06-12 RX ORDER — ASPIRIN 81 MG/1
81 TABLET, CHEWABLE ORAL DAILY
COMMUNITY

## 2021-06-12 RX ORDER — PANTOPRAZOLE SODIUM 40 MG/1
40 TABLET, DELAYED RELEASE ORAL EVERY MORNING
Status: DISCONTINUED | OUTPATIENT
Start: 2021-06-13 | End: 2021-06-13 | Stop reason: HOSPADM

## 2021-06-12 RX ORDER — NITROGLYCERIN 0.4 MG/1
0.4 TABLET SUBLINGUAL
Status: DISCONTINUED | OUTPATIENT
Start: 2021-06-12 | End: 2021-06-13 | Stop reason: HOSPADM

## 2021-06-12 RX ORDER — LISINOPRIL 20 MG/1
20 TABLET ORAL DAILY
Status: DISCONTINUED | OUTPATIENT
Start: 2021-06-13 | End: 2021-06-13 | Stop reason: HOSPADM

## 2021-06-12 RX ORDER — ASPIRIN 81 MG/1
81 TABLET, CHEWABLE ORAL DAILY
Status: DISCONTINUED | OUTPATIENT
Start: 2021-06-13 | End: 2021-06-13 | Stop reason: HOSPADM

## 2021-06-12 RX ORDER — SODIUM CHLORIDE 9 MG/ML
150 INJECTION, SOLUTION INTRAVENOUS CONTINUOUS
Status: DISCONTINUED | OUTPATIENT
Start: 2021-06-12 | End: 2021-06-13 | Stop reason: HOSPADM

## 2021-06-12 RX ORDER — OMEPRAZOLE 20 MG/1
20 CAPSULE, DELAYED RELEASE ORAL DAILY
COMMUNITY

## 2021-06-12 RX ADMIN — SODIUM CHLORIDE 150 ML/HR: 9 INJECTION, SOLUTION INTRAVENOUS at 22:29

## 2021-06-12 RX ADMIN — SODIUM CHLORIDE 1000 ML: 9 INJECTION, SOLUTION INTRAVENOUS at 20:35

## 2021-06-13 VITALS
BODY MASS INDEX: 32.58 KG/M2 | RESPIRATION RATE: 18 BRPM | SYSTOLIC BLOOD PRESSURE: 105 MMHG | TEMPERATURE: 97.4 F | WEIGHT: 215 LBS | OXYGEN SATURATION: 98 % | DIASTOLIC BLOOD PRESSURE: 64 MMHG | HEART RATE: 64 BPM | HEIGHT: 68 IN

## 2021-06-13 LAB
ANION GAP SERPL CALCULATED.3IONS-SCNC: 12.1 MMOL/L (ref 5–15)
BUN SERPL-MCNC: 26 MG/DL (ref 6–20)
BUN/CREAT SERPL: 19.4 (ref 7–25)
CALCIUM SPEC-SCNC: 9.1 MG/DL (ref 8.6–10.5)
CHLORIDE SERPL-SCNC: 103 MMOL/L (ref 98–107)
CO2 SERPL-SCNC: 22.9 MMOL/L (ref 22–29)
CREAT SERPL-MCNC: 1.34 MG/DL (ref 0.76–1.27)
CREAT UR-MCNC: 240.7 MG/DL
DEPRECATED RDW RBC AUTO: 49.4 FL (ref 37–54)
ERYTHROCYTE [DISTWIDTH] IN BLOOD BY AUTOMATED COUNT: 13.7 % (ref 12.3–15.4)
GFR SERPL CREATININE-BSD FRML MDRD: 56 ML/MIN/1.73
GLUCOSE SERPL-MCNC: 97 MG/DL (ref 65–99)
HCT VFR BLD AUTO: 46.1 % (ref 37.5–51)
HGB BLD-MCNC: 15.1 G/DL (ref 13–17.7)
MCH RBC QN AUTO: 31.6 PG (ref 26.6–33)
MCHC RBC AUTO-ENTMCNC: 32.8 G/DL (ref 31.5–35.7)
MCV RBC AUTO: 96.4 FL (ref 79–97)
PLATELET # BLD AUTO: 397 10*3/MM3 (ref 140–450)
PMV BLD AUTO: 9 FL (ref 6–12)
POTASSIUM SERPL-SCNC: 3.8 MMOL/L (ref 3.5–5.2)
RBC # BLD AUTO: 4.78 10*6/MM3 (ref 4.14–5.8)
SODIUM SERPL-SCNC: 138 MMOL/L (ref 136–145)
SODIUM UR-SCNC: 114 MMOL/L
TROPONIN T SERPL-MCNC: <0.01 NG/ML (ref 0–0.03)
WBC # BLD AUTO: 10.39 10*3/MM3 (ref 3.4–10.8)

## 2021-06-13 PROCEDURE — 84484 ASSAY OF TROPONIN QUANT: CPT | Performed by: STUDENT IN AN ORGANIZED HEALTH CARE EDUCATION/TRAINING PROGRAM

## 2021-06-13 PROCEDURE — 93005 ELECTROCARDIOGRAM TRACING: CPT | Performed by: STUDENT IN AN ORGANIZED HEALTH CARE EDUCATION/TRAINING PROGRAM

## 2021-06-13 PROCEDURE — 99217 PR OBSERVATION CARE DISCHARGE MANAGEMENT: CPT | Performed by: HOSPITALIST

## 2021-06-13 PROCEDURE — 85027 COMPLETE CBC AUTOMATED: CPT | Performed by: STUDENT IN AN ORGANIZED HEALTH CARE EDUCATION/TRAINING PROGRAM

## 2021-06-13 PROCEDURE — 80048 BASIC METABOLIC PNL TOTAL CA: CPT | Performed by: STUDENT IN AN ORGANIZED HEALTH CARE EDUCATION/TRAINING PROGRAM

## 2021-06-13 PROCEDURE — 96361 HYDRATE IV INFUSION ADD-ON: CPT

## 2021-06-13 PROCEDURE — 93010 ELECTROCARDIOGRAM REPORT: CPT | Performed by: INTERNAL MEDICINE

## 2021-06-13 PROCEDURE — G0378 HOSPITAL OBSERVATION PER HR: HCPCS

## 2021-06-13 PROCEDURE — 25010000002 ENOXAPARIN PER 10 MG: Performed by: STUDENT IN AN ORGANIZED HEALTH CARE EDUCATION/TRAINING PROGRAM

## 2021-06-13 RX ADMIN — PANTOPRAZOLE SODIUM 40 MG: 40 TABLET, DELAYED RELEASE ORAL at 06:20

## 2021-06-13 RX ADMIN — SODIUM CHLORIDE 150 ML/HR: 9 INJECTION, SOLUTION INTRAVENOUS at 06:21

## 2021-06-13 RX ADMIN — ASPIRIN 81 MG CHEWABLE TABLET 81 MG: 81 TABLET CHEWABLE at 08:46

## 2021-06-13 RX ADMIN — LISINOPRIL 20 MG: 20 TABLET ORAL at 08:47

## 2021-06-13 NOTE — PLAN OF CARE
Goal Outcome Evaluation:  Plan of Care Reviewed With: patient        Progress: improving  Outcome Summary: Vss, resting in bed, refused lovenx injection, in enhanced isolatione r/t refusal of covid swab, on ra. Denies pain/discomfort, iv fluids going at 150 ml/hr.

## 2021-06-13 NOTE — CASE MANAGEMENT/SOCIAL WORK
"Continued Stay Note  MARNI Mcmillan     Patient Name: Devonte Ambrosio  MRN: [id number]  Today's Date: 6/13/2021    Admit Date: 6/12/2021    Discharge Plan     Row Name 06/13/21 1252       Plan    Plan  [facility]    Plan Comments  Call received from primary nurse Lalitha who states that patient does not want to go to [facility] and is now agreeable to [facility]. CM called the [facility] and spoke with Joao who states they do have beds available on a first come first serve basis. CM put in for a LYFT ride for patient. CM will continue to follow for needs.    Row Name 06/13/21 1142       Plan    Plan  [facility]    Patient/Family in Agreement with Plan  yes    Plan Comments  Per Dr. Spence he is discharging the patient today. Into room and updated patient that [employer] is not open today to schedule rides, however, he can call them tomorrow to schedule and patient states \"I can't do that I don't have my Medicaid card with me\". Informed patient that I would provide him with a face sheet which has in insurance information on it and he states \"well I probably still won't be able to scheduel it\". CM informed patient that he will be discharging today and asked if he would like for CM to attempt to get him a LYFT ride to [place] to a homeless shelter and he states \"why can't you LYFT me to where I want to go\". CM explained that LYFT is a haylie by the hospital at no cost to patient but there is a milage limit and he states \"yes just get me to a homeless shelter then\". Asked patient if he would like to seek treatment for his drug addition at the [facility] in [place] and he states \"no just get me to a shelter\". CM entered a LYFT request and is still waiting for a . CM will continue to follow for needs.    Row Name 06/13/21 6983       Plan    Plan  Friend's house in [facility]    Patient/Family in Agreement with Plan  yes    Plan Comments  Into room " "and introduced self and role of CM. Discussed discharge disposition with patient at bedside. Patient is currently laying in bed with back to CM and complains of needing a ride to get to a friend's house. Patient confirs that the info on his face sheet is correct except for his address and phone number which CM did change in deomographics. He states he does not have a PCP and declines information regarding the [facility]. He also states that he does not have a living will and declines information regarding one. Pateint states he is currently homeless and is tring to get a ride to [facility] to stay with a friend Adal. He states that he is indpendent with his ADL's, does not dirve and does not have a vehicle. He also states that he does not currently use any DME and does not anticipate needing any at discharge. Patient states he has not used home health staing \"why would I need them when I don't have a home\". CM offered community resources but patient states \"all I need is a ride out of here, you need to get on the phone and call Medicaid to get me a ride\". CM informed patient that I would try to him transportation through [employer] that services Medicaid patients, but could not make any promises. Patient states if he can get a ride he plans on going to his friend Adal's house at discharge. Pateint had no other questions or concerns regarding discharge plans. CM did call [employer] @ [phone] and got a recording that states \"they provide services Monday through Friday from 8:00am to 4:30pm and on Saturday from 8:00am until 1:00pm and they do not shcedule appoints on Sunday and to call back during regular business hours. CM will discuss this with patient. Name and number placed on white board in room. CM will continue to follow for needs.        Discharge Codes    No documentation.       Expected Discharge Date and Time     Expected Discharge Date Expected Discharge Time "    Jun 13, 2021             Violet Nguyen RN

## 2021-06-13 NOTE — DISCHARGE SUMMARY
Devonte Ambrosio  1970  [id number]    Hospitalists Discharge Summary    Date of Admission: 6/12/2021  Date of Discharge:  6/13/2021    Primary Discharge Diagnoses:  1.  HERNAN  2.  Substance abuse  3.  Chest Pain  4.  Obesity Body mass index is 32.69 kg/m².    History of Present Illness (taken from H&P):  51 y/o M with hx of CVA with no residual weakness, MI, HTN, amphetamine abuse and GERD presented to ED this evening with complaints of chest pain. Pain was localized on left mid-axillary area, 10/10, pressure-like, non-radiating, accompanied by palpitations.   Per patient he also felt like he was having a panic attack but was not able to differentiate if the pain was due to that or MI since he had one before. Patient says he usually gets panic attacks when he consumes meth. He admits to consuming meth and marijuana earlier today, claims he relapsed after 21 months clean.     Hospital Course:  Mr. Ambrosio was admitted to the Med/Surg unit and continued on IVF.  Creatinine was better than what was reported from discharge in April.  I will continue to hold his ACEI, however, until he has been seen at follow-up.  Troponin were negative x2.  No dynamic EKG changes noted on serial exams.    PCP  Patient Care Team:  Provider, No Known as PCP - General    Consults:   Consults     No orders found for last 30 day(s).          Operations and Procedures Performed:       XR Chest 2 View    Result Date: 6/12/2021  Narrative: CR Chest 2 Vws INDICATION:  Chest pain COMPARISON:  None. FINDINGS: PA and lateral views of the chest.  Heart and mediastinal contours are normal. The lungs are clear apart from calcified granuloma and mediastinal nodes No pneumothorax or pleural effusion.      Impression: No acute cardiopulmonary findings. Signer Name: Christina Pereyra MD  Signed: 6/12/2021 8:57 PM  Workstation Name: [id number]  [facility]      Allergies:  is allergic to penicillins.    Jett  reviewed    Discharge  Medications:     Discharge Medications      Continue These Medications      Instructions Start Date   aspirin 81 MG chewable tablet   81 mg, Oral, Daily      omeprazole 20 MG capsule  Commonly known as: priLOSEC   20 mg, Oral, Daily         Stop These Medications    lisinopril 20 MG tablet  Commonly known as: PRINIVILZESTRIL            Last Lab Results:   Lab Results (most recent)     Procedure Component Value Units Date/Time    Basic Metabolic Panel [[id number]]  (Abnormal) Collected: 06/13/21 0742    Specimen: Blood Updated: 06/13/21 0832     Glucose 97 mg/dL      BUN 26 mg/dL      Creatinine 1.34 mg/dL      Sodium 138 mmol/L      Potassium 3.8 mmol/L      Chloride 103 mmol/L      CO2 22.9 mmol/L      Calcium 9.1 mg/dL      eGFR Non African Amer 56 mL/min/1.73      BUN/Creatinine Ratio 19.4     Anion Gap 12.1 mmol/L     Narrative:      GFR Normal >60  Chronic Kidney Disease <60  Kidney Failure <15      Troponin [[id number]]  (Normal) Collected: 06/13/21 0742    Specimen: Blood Updated: 06/13/21 0827     Troponin T <0.010 ng/mL     Narrative:      Troponin T Reference Range:  <= 0.03 ng/mL-   Negative for AMI  >0.03 ng/mL-     Abnormal for myocardial necrosis.  Clinicians would have to utilize clinical acumen, EKG, Troponin and serial changes to determine if it is an Acute Myocardial Infarction or myocardial injury due to an underlying chronic condition.       Results may be falsely decreased if patient taking Biotin.      CBC (No Diff) [[id number]]  (Normal) Collected: 06/13/21 0742    Specimen: Blood Updated: 06/13/21 0815     WBC 10.39 10*3/mm3      RBC 4.78 10*6/mm3      Hemoglobin 15.1 g/dL      Hematocrit 46.1 %      MCV 96.4 fL      MCH 31.6 pg      MCHC 32.8 g/dL      RDW 13.7 %      RDW-SD 49.4 fl      MPV 9.0 fL      Platelets 397 10*3/mm3     Sodium, Urine, Random - Urine, Clean Catch [[id number]] Collected: 06/12/21 2341    Specimen: Urine, Clean Catch Updated: 06/13/21 0024     Sodium, Urine 114 mmol/L      Narrative:      Reference intervals for random urine have not been established.  Clinical usage is dependent upon physician's interpretation in combination with other laboratory tests.       Creatinine, Urine, Random - Urine, Clean Catch [[id number]] Collected: 06/12/21 2341    Specimen: Urine, Clean Catch Updated: 06/13/21 0011    Magnesium [[id number]]  (Normal) Collected: 06/12/21 2033    Specimen: Blood Updated: 06/12/21 2321     Magnesium 2.4 mg/dL     Phosphorus [[id number]]  (Normal) Collected: 06/12/21 2033    Specimen: Blood Updated: 06/12/21 2321     Phosphorus 3.5 mg/dL     Urine Drug Screen - Urine, Clean Catch [[id number]]  (Abnormal) Collected: 06/12/21 2118    Specimen: Urine, Clean Catch Updated: 06/12/21 2137     THC, Screen, Urine Positive     Phencyclidine (PCP), Urine Negative     Cocaine Screen, Urine Negative     Methamphetamine, Ur Positive     Opiate Screen Negative     Amphetamine Screen, Urine Positive     Benzodiazepine Screen, Urine Negative     Tricyclic Antidepressants Screen Negative     Methadone Screen, Urine Negative     Barbiturates Screen, Urine Negative     Oxycodone Screen, Urine Negative     Propoxyphene Screen Negative     Buprenorphine, Screen, Urine Negative    Narrative:      Urine drug screen results are to be used for medical purposes only.  They are not to be used for legal purposes such as employment testing.  Negative results do not necessarily mean the complete absence of a subtance, but rather that the result is less than the cutoff for that substance.  Positive results are unconfirmed and considered Preliminary Positive.  [facility] does not automatically confirm Postitive Unconfirmed results.  The physician may request (order) an Unconfirmed Positive result to be sent out for confirmation.      Negative Thresholds for Drugs Screened:    THC screen, urine                          50 ng/ml  Phenycyclidine (PCP), urine                25 ng/ml  Cocaine  screen, urine                     150 ng/ml  Methamphetamine, urine                    500 ng/ml  Opiate screen, urine                      100 ng/ml  Amphetamine screen, urine                 500 ng/ml  Benzodiazepine screen, urine              150 ng/ml  Tricyclic Antidepressants screen, urine   300 ng/ml  Methadone screen, urine                   200 ng/ml  Barbiturates screen, urine                200 ng/ml  Oxycodone screen, urine                   100 ng/ml  Propoxyphene screen, urine                300 ng/ml  Buprenorphine screen, urine                10 ng/ml    BNP [[id number]]  (Abnormal) Collected: 06/12/21 2033    Specimen: Blood Updated: 06/12/21 2106     proBNP 930.5 pg/mL     Narrative:      Among patients with dyspnea, NT-proBNP is highly sensitive for the detection of acute congestive heart failure. In addition NT-proBNP of <300 pg/ml effectively rules out acute congestive heart failure with 99% negative predictive value.    Results may be falsely decreased if patient taking Biotin.      Troponin [[id number]]  (Normal) Collected: 06/12/21 2033    Specimen: Blood Updated: 06/12/21 2101     Troponin T <0.010 ng/mL     Narrative:      Troponin T Reference Range:  <= 0.03 ng/mL-   Negative for AMI  >0.03 ng/mL-     Abnormal for myocardial necrosis.  Clinicians would have to utilize clinical acumen, EKG, Troponin and serial changes to determine if it is an Acute Myocardial Infarction or myocardial injury due to an underlying chronic condition.       Results may be falsely decreased if patient taking Biotin.      Comprehensive Metabolic Panel [[id number]]  (Abnormal) Collected: 06/12/21 2033    Specimen: Blood Updated: 06/12/21 2057     Glucose 122 mg/dL      BUN 26 mg/dL      Creatinine 2.40 mg/dL      Sodium 140 mmol/L      Potassium 3.4 mmol/L      Chloride 100 mmol/L      CO2 22.0 mmol/L      Calcium 10.1 mg/dL      Total Protein 8.5 g/dL      Albumin 5.00 g/dL      ALT (SGPT) 17 U/L      AST (SGOT) 21  U/L      Alkaline Phosphatase 74 U/L      Total Bilirubin 0.8 mg/dL      eGFR Non African Amer 29 mL/min/1.73      Globulin 3.5 gm/dL      A/G Ratio 1.4 g/dL      BUN/Creatinine Ratio 10.8     Anion Gap 18.0 mmol/L     Narrative:      GFR Normal >60  Chronic Kidney Disease <60  Kidney Failure <15      Ethanol [[id number]] Collected: 06/12/21 2033    Specimen: Blood Updated: 06/12/21 2057     Ethanol <10 mg/dL      Ethanol % <0.010 %     CBC & Differential [[id number]]  (Abnormal) Collected: 06/12/21 2033    Specimen: Blood Updated: 06/12/21 2041    Narrative:      The following orders were created for panel order CBC & Differential.  Procedure                               Abnormality         Status                     ---------                               -----------         ------                     CBC Auto Differential[[id number]]        Abnormal            Final result                 Please view results for these tests on the individual orders.    CBC Auto Differential [[id number]]  (Abnormal) Collected: 06/12/21 2033    Specimen: Blood Updated: 06/12/21 2041     WBC 15.39 10*3/mm3      RBC 5.23 10*6/mm3      Hemoglobin 16.7 g/dL      Hematocrit 48.7 %      MCV 93.1 fL      MCH 31.9 pg      MCHC 34.3 g/dL      RDW 13.9 %      RDW-SD 47.5 fl      MPV 9.1 fL      Platelets 490 10*3/mm3      Neutrophil % 70.6 %      Lymphocyte % 17.7 %      Monocyte % 9.8 %      Eosinophil % 0.9 %      Basophil % 0.7 %      Immature Grans % 0.3 %      Neutrophils, Absolute 10.87 10*3/mm3      Lymphocytes, Absolute 2.72 10*3/mm3      Monocytes, Absolute 1.51 10*3/mm3      Eosinophils, Absolute 0.14 10*3/mm3      Basophils, Absolute 0.11 10*3/mm3      Immature Grans, Absolute 0.04 10*3/mm3      nRBC 0.0 /100 WBC         Imaging Results (Most Recent)     Procedure Component Value Units Date/Time    XR Chest 2 View [[id number]] Collected: 06/12/21 2057     Updated: 06/12/21 2059    Narrative:      CR Chest 2 Vws    INDICATION:    Chest  pain    COMPARISON:    None.    FINDINGS:   PA and lateral views of the chest.  Heart and mediastinal contours are normal. The lungs are clear apart from calcified granuloma and mediastinal nodes No pneumothorax or pleural effusion.        Impression:      No acute cardiopulmonary findings.    Signer Name: Christina Pereyra MD   Signed: 6/12/2021 8:57 PM   Workstation Name: [id number]    [facility]          PROCEDURES      Condition on Discharge:  Stable    Physical Exam at Discharge  Vital Signs  Temp:  [96.5 °F (35.8 °C)-98.7 °F (37.1 °C)] 97.4 °F (36.3 °C)  Heart Rate:  [] 64  Resp:  [16-18] 18  BP: (101-122)/(64-95) 105/64    Physical Exam (examined from the door, as he refused COVID-19 swabbing, so I could conserve PPE):  Physical Exam   Constitutional: Patient appears well-developed and well-nourished and in no acute distress   Pulmonary/Chest: No respiratory distress  Abdominal: Obese.  Neurological: Cranial nerves II-XII are grossly intact with no focal deficits.    Discharge Disposition  Home    Visiting Nurse:    No     Home PT/OT:  No     Home Safety Evaluation:  No     DME  None    Discharge Diet:      Dietary Orders (From admission, onward)     Start     Ordered    06/12/21 2309  Diet Regular; Cardiac  Diet Effective Now     Question Answer Comment   Diet Texture / Consistency Regular    Common Modifiers Cardiac        06/12/21 2308                Activity at Discharge:  As tolerated    Follow-up Appointments  No future appointments.  Additional Instructions for the Follow-ups that You Need to Schedule     Discharge Follow-up with PCP   As directed       Currently Documented PCP:    Provider, No Known    PCP Phone Number:    [phone]     Follow Up Details: Needs to establish w/ PCP at hometown within 2 weeks               Test Results Pending at Discharge  Pending Labs     Order Current Status    Creatinine, Urine, Random - Urine, Clean Catch In process           Jak HILARIO  MD Jordy  06/13/21  11:31 EDT

## 2021-06-13 NOTE — PLAN OF CARE
Goal Outcome Evaluation:  Plan of Care Reviewed With: patient        Progress: no change  Outcome Summary: pt arrived to unit with HERNAN, substance abuse - refused COVID swab - pt concerned about a ride from hospital - wonders if he will get to leave today - rested well through night

## 2021-06-13 NOTE — ED PROVIDER NOTES
EMERGENCY DEPARTMENT ENCOUNTER      Room Number: 06/06    History is provided by the patient, no translation services needed    HPI:    Chief complaint: Chest pain    Location: Left chest    Quality/Severity: 0/10 at this time.  Moderate tightness that started around 6 PM.    Timing/Duration: Chest tightness started around 6 PM, intermittent since.    Modifying Factors: Patient states chest pain started after he was in a stressful situation.  He states he also feels like he was having a panic attack.  EMS gave aspirin and 1 sublingual nitro however he did not have any relief with that.  He states his pain persisted while while in route. He states he feels better and his pain has resolved after lying down in the ER bed.    Associated Symptoms: Positive for chest tightness.  Denies any shortness of breath, nausea, vomiting, diaphoresis, leg pain or swelling.    Narrative: Pt is a 50 y.o. male who presents complaining of chest pain and left lateral rib cage that started around 6 PM this evening.  Patient states he has a history of MI, CVA, HTN, GERD, and history of polysubstance abuse.  He denies any history of coronary artery disease and states his MI, as well as his CVA, occurred after he snorted an 8 ball of cocaine a few years ago.  He states he is addicted to methamphetamine, he reports he relapsed last night after being clean for 21 months.  It is noted in his chart review that he was admitted on 4/25/2021 for methamphetamine abuse at [facility].  He states today he went to the police department in [facility] today, and asked for help to get into a substance abuse program.  He states [facility] police told him that they would help get him into a program if he helped them with an arrest.  He states he went in more a wire and bought methamphetamine from somebody so the police could arrest them.  He states after that he started having chest pain along the left lateral aspect of his rib cage.  He  denies any radiation of the pain.  Denies any shortness of breath, nausea, vomiting, or diaphoresis.  He states he still feels very anxious.  He was hoping to get a ride home to [facility] from the police however they wanted him to be seen in the emergency department, so they called him an ambulance.      PMD: Provider, No Known    REVIEW OF SYSTEMS  Review of Systems   Constitutional: Negative for chills and fever.   Respiratory: Negative for cough and shortness of breath.    Cardiovascular: Positive for chest pain. Negative for palpitations.   Gastrointestinal: Negative for abdominal pain, nausea and vomiting.   Genitourinary: Negative for difficulty urinating and dysuria.   Musculoskeletal: Negative for arthralgias and myalgias.   Skin: Negative for pallor and rash.   Neurological: Negative for dizziness, syncope and headaches.   Psychiatric/Behavioral: Negative for confusion. The patient is nervous/anxious.          PAST MEDICAL HISTORY  Active Ambulatory Problems     Diagnosis Date Noted   • No Active Ambulatory Problems     Resolved Ambulatory Problems     Diagnosis Date Noted   • No Resolved Ambulatory Problems     Past Medical History:   Diagnosis Date   • Acute liver failure    • Anxiety    • Auditory hallucination    • Cocaine abuse (CMS/HCC)    • Depression    • GERD (gastroesophageal reflux disease)    • Hepatitis B    • Hypertension    • Methamphetamine addiction (CMS/HCC)    • Myocardial infarction (CMS/HCC)    • Stroke (CMS/HCC)    • Suicidal ideation        PAST SURGICAL HISTORY  History reviewed. No pertinent surgical history.    FAMILY HISTORY  History reviewed. No pertinent family history.    SOCIAL HISTORY  Social History     Socioeconomic History   • Marital status: Single     Spouse name: Not on file   • Number of children: Not on file   • Years of education: Not on file   • Highest education level: Not on file   Tobacco Use   • Smoking status: Current Every Day Smoker     Packs/day: 0.50   •  Smokeless tobacco: Current User     Types: Chew   Vaping Use   • Vaping Use: Every day   Substance and Sexual Activity   • Alcohol use: Not Currently   • Drug use: Yes     Types: Methamphetamines, Marijuana   • Sexual activity: Defer       ALLERGIES  Patient has no known allergies.      Current Facility-Administered Medications:   •  [COMPLETED] Insert peripheral IV, , , Once **AND** sodium chloride 0.9 % flush 10 mL, 10 mL, Intravenous, PRN, Alexandra Berumen PA-C  •  sodium chloride 0.9 % infusion, 150 mL/hr, Intravenous, Continuous, Alexandra Berumen PA-C    Current Outpatient Medications:   •  aspirin 81 MG chewable tablet, Chew 81 mg Daily., Disp: , Rfl:   •  lisinopril (PRINIVIL,ZESTRIL) 20 MG tablet, Take 20 mg by mouth Daily., Disp: , Rfl:   •  omeprazole (priLOSEC) 20 MG capsule, Take 20 mg by mouth Daily., Disp: , Rfl:     PHYSICAL EXAM  ED Triage Vitals [06/12/21 2014]   Temp Heart Rate Resp BP SpO2   98.7 °F (37.1 °C) 102 16 104/71 96 %      Temp src Heart Rate Source Patient Position BP Location FiO2 (%)   Oral Monitor Lying Right arm --       Physical Exam  Vitals and nursing note reviewed.   Constitutional:       General: He is not in acute distress.     Appearance: He is not toxic-appearing.   HENT:      Head: Normocephalic and atraumatic.   Eyes:      Conjunctiva/sclera: Conjunctivae normal.      Pupils: Pupils are equal, round, and reactive to light.   Cardiovascular:      Rate and Rhythm: Normal rate and regular rhythm.      Pulses: Normal pulses.   Pulmonary:      Effort: Pulmonary effort is normal.      Breath sounds: Normal breath sounds.   Abdominal:      General: Bowel sounds are normal.      Palpations: Abdomen is soft.      Tenderness: There is no abdominal tenderness. There is no guarding.   Musculoskeletal:         General: Normal range of motion.      Cervical back: Normal range of motion and neck supple.   Skin:     General: Skin is warm and dry.      Capillary Refill: Capillary  refill takes less than 2 seconds.   Neurological:      Mental Status: He is alert and oriented to person, place, and time.   Psychiatric:         Attention and Perception: He does not perceive auditory or visual hallucinations.         Mood and Affect: Affect normal. Mood is anxious.         Speech: Speech normal.         Behavior: Behavior normal. Behavior is cooperative.         Thought Content: Thought content normal.         Cognition and Memory: Cognition and memory normal.         Judgment: Judgment normal.           LAB RESULTS  Lab Results (last 24 hours)     Procedure Component Value Units Date/Time    CBC & Differential [[id number]]  (Abnormal) Collected: 06/12/21 2033    Specimen: Blood Updated: 06/12/21 2041    Narrative:      The following orders were created for panel order CBC & Differential.  Procedure                               Abnormality         Status                     ---------                               -----------         ------                     CBC Auto Differential[[id number]]        Abnormal            Final result                 Please view results for these tests on the individual orders.    Comprehensive Metabolic Panel [[id number]]  (Abnormal) Collected: 06/12/21 2033    Specimen: Blood Updated: 06/12/21 2057     Glucose 122 mg/dL      BUN 26 mg/dL      Creatinine 2.40 mg/dL      Sodium 140 mmol/L      Potassium 3.4 mmol/L      Chloride 100 mmol/L      CO2 22.0 mmol/L      Calcium 10.1 mg/dL      Total Protein 8.5 g/dL      Albumin 5.00 g/dL      ALT (SGPT) 17 U/L      AST (SGOT) 21 U/L      Alkaline Phosphatase 74 U/L      Total Bilirubin 0.8 mg/dL      eGFR Non African Amer 29 mL/min/1.73      Globulin 3.5 gm/dL      A/G Ratio 1.4 g/dL      BUN/Creatinine Ratio 10.8     Anion Gap 18.0 mmol/L     Narrative:      GFR Normal >60  Chronic Kidney Disease <60  Kidney Failure <15      Troponin [[id number]]  (Normal) Collected: 06/12/21 2033    Specimen: Blood Updated: 06/12/21 2101      Troponin T <0.010 ng/mL     Narrative:      Troponin T Reference Range:  <= 0.03 ng/mL-   Negative for AMI  >0.03 ng/mL-     Abnormal for myocardial necrosis.  Clinicians would have to utilize clinical acumen, EKG, Troponin and serial changes to determine if it is an Acute Myocardial Infarction or myocardial injury due to an underlying chronic condition.       Results may be falsely decreased if patient taking Biotin.      BNP [[id number]]  (Abnormal) Collected: 06/12/21 2033    Specimen: Blood Updated: 06/12/21 2106     proBNP 930.5 pg/mL     Narrative:      Among patients with dyspnea, NT-proBNP is highly sensitive for the detection of acute congestive heart failure. In addition NT-proBNP of <300 pg/ml effectively rules out acute congestive heart failure with 99% negative predictive value.    Results may be falsely decreased if patient taking Biotin.      CBC Auto Differential [[id number]]  (Abnormal) Collected: 06/12/21 2033    Specimen: Blood Updated: 06/12/21 2041     WBC 15.39 10*3/mm3      RBC 5.23 10*6/mm3      Hemoglobin 16.7 g/dL      Hematocrit 48.7 %      MCV 93.1 fL      MCH 31.9 pg      MCHC 34.3 g/dL      RDW 13.9 %      RDW-SD 47.5 fl      MPV 9.1 fL      Platelets 490 10*3/mm3      Neutrophil % 70.6 %      Lymphocyte % 17.7 %      Monocyte % 9.8 %      Eosinophil % 0.9 %      Basophil % 0.7 %      Immature Grans % 0.3 %      Neutrophils, Absolute 10.87 10*3/mm3      Lymphocytes, Absolute 2.72 10*3/mm3      Monocytes, Absolute 1.51 10*3/mm3      Eosinophils, Absolute 0.14 10*3/mm3      Basophils, Absolute 0.11 10*3/mm3      Immature Grans, Absolute 0.04 10*3/mm3      nRBC 0.0 /100 WBC     Ethanol [[id number]] Collected: 06/12/21 2033    Specimen: Blood Updated: 06/12/21 2057     Ethanol <10 mg/dL      Ethanol % <0.010 %     Urine Drug Screen - Urine, Clean Catch [[id number]]  (Abnormal) Collected: 06/12/21 2118    Specimen: Urine, Clean Catch Updated: 06/12/21 2137     THC, Screen, Urine Positive      Phencyclidine (PCP), Urine Negative     Cocaine Screen, Urine Negative     Methamphetamine, Ur Positive     Opiate Screen Negative     Amphetamine Screen, Urine Positive     Benzodiazepine Screen, Urine Negative     Tricyclic Antidepressants Screen Negative     Methadone Screen, Urine Negative     Barbiturates Screen, Urine Negative     Oxycodone Screen, Urine Negative     Propoxyphene Screen Negative     Buprenorphine, Screen, Urine Negative    Narrative:      Urine drug screen results are to be used for medical purposes only.  They are not to be used for legal purposes such as employment testing.  Negative results do not necessarily mean the complete absence of a subtance, but rather that the result is less than the cutoff for that substance.  Positive results are unconfirmed and considered Preliminary Positive.  [facility] does not automatically confirm Postitive Unconfirmed results.  The physician may request (order) an Unconfirmed Positive result to be sent out for confirmation.      Negative Thresholds for Drugs Screened:    THC screen, urine                          50 ng/ml  Phenycyclidine (PCP), urine                25 ng/ml  Cocaine screen, urine                     150 ng/ml  Methamphetamine, urine                    500 ng/ml  Opiate screen, urine                      100 ng/ml  Amphetamine screen, urine                 500 ng/ml  Benzodiazepine screen, urine              150 ng/ml  Tricyclic Antidepressants screen, urine   300 ng/ml  Methadone screen, urine                   200 ng/ml  Barbiturates screen, urine                200 ng/ml  Oxycodone screen, urine                   100 ng/ml  Propoxyphene screen, urine                300 ng/ml  Buprenorphine screen, urine                10 ng/ml            I ordered the above labs and reviewed the results    RADIOLOGY  XR Chest 2 View    Result Date: 6/12/2021  CR Chest 2 Vws INDICATION:  Chest pain COMPARISON:  None. FINDINGS: PA and  lateral views of the chest.  Heart and mediastinal contours are normal. The lungs are clear apart from calcified granuloma and mediastinal nodes No pneumothorax or pleural effusion.      No acute cardiopulmonary findings. Signer Name: Christina Pereyra MD  Signed: 6/12/2021 8:57 PM  Workstation Name: [id number]  [facility]      I ordered the above radiologic testing and reviewed the results    PROCEDURES  Procedures      PROGRESS AND CONSULTS  ED Course as of Ankur 12 2215   Sat Jun 12, 2021   2020 EKG         EKG time / Interpretation time: 2011/2014  Rhythm/Rate: Sinus tachycardia, 102   OH: 132  QRS, axis: -43  QTc 477  ST and T waves: There is no significant ST elevation or depression, no T wave inversions.  EKG Tracing Interpreted Contemporaneously by me, independently viewed by me and MD.  No prior EKG with which to compare.      [KS]   2141 Patient's creatinine was noted to be 1.44 on 4/25/2021 when he was admitted for methamphetamine addiction at [facility].   Creatinine(!): 2.40 [KS]   2205 CONSULT  Discussed case with Dr Fishman, hospitalist on-call this evening.  Reviewed history, exam, results and treatments.  Discussed concerns and plan of care. Dr Fishman accepts patient for observation for acute kidney injury and further fluid administration.        [KS]      ED Course User Index  [KS] Alexandra Berumen, HEIDI           MEDICAL DECISION MAKING    MDM      HEART Score (for prediction of 6-week risk of major adverse cardiac event) reviewed and/or performed as part of the patient evaluation and treatment planning process.  The result associated with this review/performance is: 3      DIAGNOSIS  Final diagnoses:   Acute kidney injury (CMS/HCC)   Left-sided chest pain   Methamphetamine abuse (CMS/HCC)   Anxiety       Latest Documented Vital Signs:  As of 22:15 EDT  BP- 119/75 HR- 89 Temp- 98.7 °F (37.1 °C) (Oral) O2 sat- 98%    DISPOSITION  Patient admitted to [facility].    Smoking and drug cessation discussed with patient.         Medication List      No changes were made to your prescriptions during this visit.                   Dictated utilizing Dragon dictation     Alexandra Berumen PA-C  06/12/21 0843

## 2021-06-13 NOTE — CASE MANAGEMENT/SOCIAL WORK
Continued Stay Note   Jes[place]     Patient Name: Devonte Ambrosio  MRN: [id number]  Today's Date: 6/13/2021    Admit Date: 6/12/2021    Discharge Plan     Row Name 06/13/21 1347       Plan    Plan  [facility]    Plan Comments  Primary nurse called and stated that patient just told her he got a phone call and a  is waiting for him at the ER entrance. Lalitha primary nurse walked patient to his LYFT ride and CM called Gabino with [employer] back and updated him. [facility].    Row Name 06/13/21 1313       Plan    Plan  [facility]    Patient/Family in Agreement with Plan  yes    Plan Comments  Discussed with patient that [facility] beds available on a first come first service basis and that we would need to bring his medications with him and be willing to stay 4 to 5 day for detox and patient is agreeable to this plan. After multiple attempts to arrange a LYFT ride with no success CM called Moira with [employer] to check the cost to [facility] and he states it will be around $55.00 dollars. CM called [facility] and discussed the transportation issue with her and the cost for a taxi ride and she is agreeable to pay for patient's ride. CM went to  office and picked up the credit card to pay. CM called [employer] back and he states he can be here in about 25 minutes. Updated CN Nadia. CM will continue to follow for needs.    Row Name 06/13/21 1252       Plan    Plan  [facility]    Plan Comments  Call received from primary nurse Lalitha who states that patient does not want to go to [facility] and is now agreealbe to [facility]. CM called the [facility] and spoke with Joao who states they do have beds available on a first come first serve basis. CM put in for a LYFT ride for patient. CM will continue to follow for needs.    Row Name 06/13/21 1142       Plan    Plan  [facility]     "Patient/Family in Agreement with Plan  yes    Plan Comments  Per Dr. Spence he is discharging the patient today. Into room and updated patient that [employer] is not open today to schedule rides, however, he can call them tomorrow to schedule and patient states \"I can't do that I don't have my Medicaid card with me\". Informed patient that I would provide him with a face sheet which has in insurance information on it and he states \"well I probably still won't be able to scheduel it\". CM informed patient that he will be discharging today and asked if he would like for CM to attempt to get him a LYFT ride to [place] to a homeless shelter and he states \"why can't you LYFT me to where I want to go\". CM explained that LYFT is a haylie by the hospital at no cost to patient but there is a milage limit and he states \"yes just get me to a homeless shelter then\". Asked patient if he would like to seek treatment for his drug addition at the [facility] in [place] and he states \"no just get me to a shelter\". CM entered a LYFT request and is still waiting for a . CM will continue to follow for needs.        Discharge Codes    No documentation.       Expected Discharge Date and Time     Expected Discharge Date Expected Discharge Time    Jun 13, 2021             Violet Nguyen RN    "

## 2021-06-13 NOTE — CASE MANAGEMENT/SOCIAL WORK
"Continued Stay Note   [facility] [employer]     Patient Name: Devonte Ambrosio  MRN: [id number]  Today's Date: 6/13/2021    Admit Date: 6/12/2021    Discharge Plan     Row Name 06/13/21 1142       Plan    Plan  [facility]    Patient/Family in Agreement with Plan  yes    Plan Comments  Per Dr. Spence he is discharging the patient today. Into room and updated patient that [employer] is not open today to schedule rides, however, he can call them tomorrow to schedule and patient states \"I can't do that I don't have my Medicaid card with me\". Informed patient that I would provide him with a face sheet which has in insurance information on it and he states \"well I probably still won't be able to schedule it\". CM informed patient that he will be discharging today and asked if he would like for CM to attempt to get him a LYFT ride to [place] to a homeless shelter and he states \"why can't you LYFT me to where I want to go\". CM explained that LYFT is a haylie by the hospital at no cost to patient but there is a mileage limit and he states \"yes just get me to a homeless shelter then\". Asked patient if he would like to seek treatment for his drug addition at the [facility] in [place] and he states \"no just get me to a shelter\". CM entered a LYFT request and is still waiting for a . CM will continue to follow for needs.    Row Name 06/13/21 1043       Plan    Plan  [facility]    Patient/Family in Agreement with Plan  yes    Plan Comments  Into room and introduced self and role of CM. Discussed discharge disposition with patient at bedside. Patient is currently laying in bed with back to CM and complains of needing a ride to get to a friend's house. Patient confirs that the info on his face sheet is correct except for his address and phone number which CM did change in deomographics. He states he does not have a PCP and declines information regarding the [facility]. He also " "states that he does not have a living will and declines information regarding one. Pateint states he is currently homeless and is tring to get a ride to [facility] to stay with a friend Adal. He states that he is indpendent with his ADL's, does not dirve and does not have a vehicle. He also states that he does not currently use any DME and does not anticipate needing any at discharge. Patient states he has not used home health staing \"why would I need them when I don't have a home\". CM offered community resources but patient states \"all I need is a ride out of here, you need to get on the phone and call Medicaid to get me a ride\". CM informed patient that I would try to him transportation through [employer] that services Medicaid patients, but could not make any promises. Patient states if he can get a ride he plans on going to his friend Adal's house at discharge. Pateint had no other questions or concerns regarding discharge plans. CM did call [employer] @ [phone] and got a recording that states \"they provide services Monday through Friday from 8:00am to 4:30pm and on Saturday from 8:00am until 1:00pm and they do not shcedule appoints on Sunday and to call back during regular business hours. CM will discuss this with patient. Name and number placed on white board in room. CM will continue to follow for needs.        Discharge Codes    No documentation.       Expected Discharge Date and Time     Expected Discharge Date Expected Discharge Time    Jun 13, 2021             Violet Nguyen RN    "

## 2021-06-13 NOTE — ED NOTES
Spoke w/ [facility] as patient is refusing covid swab. Pt will need to put into isolation and treated as a + COVID patient     Joana Lowery RN  06/12/21 0736

## 2021-06-13 NOTE — CASE MANAGEMENT/SOCIAL WORK
Case Management Discharge Note      Final Note: Discharged to [facility].    Provided Post Acute Provider List?: Refused  Refused Provider List Comment: Offered community resources but patient declines the need for them at this time.    Selected Continued Care - Discharged on 6/13/2021 Admission date: 6/12/2021 - Discharge disposition: Home or Self Care    Destination    No services have been selected for the patient.              Durable Medical Equipment    No services have been selected for the patient.              Dialysis/Infusion    No services have been selected for the patient.              Home Medical Care    No services have been selected for the patient.              Therapy    No services have been selected for the patient.              Community Resources    No services have been selected for the patient.              Community & DME    No services have been selected for the patient.                       Final Discharge Disposition Code: 65 - psychiatric hospital or unit

## 2021-06-13 NOTE — H&P
[facility]   HISTORY AND PHYSICAL    Patient Name: Devonte Ambrosio  : 1970  MRN: [id number]  Primary Care Physician:  Provider, No Known  Date of admission: 2021    Subjective   Subjective     Chief Complaint: chest pain    51 y/o M with hx of CVA with no residual weakness, MI, HTN, amphetamine abuse and GERD presented to ED this evening with complaints of chest pain. Pain was localized on left mid-axillary area, 10/10, pressure-like, non-radiating, accompanied by palpitations.   Per patient he also felt like he was having a panic attack but was not able to differentiate if the pain was due to that or MI since he had one before. Patient says he usually gets panic attacks when he consumes meth. He admits to consuming meth and marijuana earlier today, claims he relapsed after 21 months clean.       Review of Systems   Constitutional: Negative.    HENT: Negative.    Eyes: Negative.    Respiratory: Negative.    Cardiovascular: Negative.    Gastrointestinal: Negative.    Endocrine: Negative.    Genitourinary: Negative.    Musculoskeletal: Negative.    Skin: Negative.    Allergic/Immunologic: Negative.    Neurological: Negative.    Hematological: Negative.    Psychiatric/Behavioral: Negative.    All other systems reviewed and are negative.       Personal History     Past Medical History:   Diagnosis Date   • Acute liver failure    • Anxiety    • Auditory hallucination    • Cocaine abuse (CMS/HCC)    • Depression    • GERD (gastroesophageal reflux disease)    • Hepatitis B    • Hypertension    • Methamphetamine addiction (CMS/HCC)    • Myocardial infarction (CMS/HCC)    • Stroke (CMS/HCC)    • Suicidal ideation        History reviewed. No pertinent surgical history.    Family History: family history is not on file. Otherwise pertinent FHx was reviewed and not pertinent to current issue.    Social History:  reports that he has been smoking. He has been smoking about 0.50 packs per day. His smokeless tobacco  use includes chew. He reports previous alcohol use. He reports current drug use. Drugs: Methamphetamines and Marijuana.    Home Medications:  aspirin, lisinopril, and omeprazole    Allergies:  No Known Allergies    Objective    Objective     Vitals:   Temp:  [97.5 °F (36.4 °C)-98.7 °F (37.1 °C)] 97.5 °F (36.4 °C)  Heart Rate:  [] 86  Resp:  [16-18] 18  BP: (101-122)/(66-95) 108/66    Physical Exam  Vitals and nursing note reviewed.   Constitutional:       General: He is not in acute distress.     Appearance: Normal appearance. He is not ill-appearing or toxic-appearing.   HENT:      Head: Normocephalic and atraumatic.      Right Ear: External ear normal.      Left Ear: External ear normal.      Nose: Nose normal. No congestion or rhinorrhea.      Mouth/Throat:      Mouth: Mucous membranes are moist.      Pharynx: No oropharyngeal exudate or posterior oropharyngeal erythema.   Eyes:      Extraocular Movements: Extraocular movements intact.      Conjunctiva/sclera: Conjunctivae normal.      Pupils: Pupils are equal, round, and reactive to light.   Cardiovascular:      Rate and Rhythm: Regular rhythm. Tachycardia present.      Pulses: Normal pulses.      Heart sounds: Normal heart sounds. No murmur heard.   No friction rub. No gallop.    Pulmonary:      Effort: Pulmonary effort is normal. No respiratory distress.      Breath sounds: Normal breath sounds. No stridor. No wheezing, rhonchi or rales.   Chest:      Chest wall: No tenderness.   Abdominal:      General: There is no distension.      Palpations: Abdomen is soft.      Tenderness: There is no abdominal tenderness.   Musculoskeletal:         General: No swelling or tenderness. Normal range of motion.      Cervical back: Normal range of motion and neck supple.      Right lower leg: No edema.      Left lower leg: No edema.   Skin:     General: Skin is warm.   Neurological:      General: No focal deficit present.      Mental Status: He is alert and oriented to  person, place, and time.         Result Review    Result Review:  I have personally reviewed the results from the time of this admission to 6/12/2021 23:11 EDT and agree with these findings:  [x]  Laboratory  []  Microbiology  []  Radiology  [x]  EKG/Telemetry   []  Cardiology/Vascular   []  Pathology  [x]  Old records  []  Other:  Most notable findings include: Positive drug screen (THC, amphetamines), elevated SrCr 2.40 when compared to 6-7 weeks prior 1.4    Assessment/Plan   Assessment / Plan     Brief Patient Summary:  Devonte Ambrosio is a 50 y.o. male who presented to ED w/ complaints of chest pain + panic attack, found to have an HERNAN. Admitted for observation due to prior hx of CAD/MI/CVA.   Active Hospital Problems:  Active Hospital Problems    Diagnosis    • Acute kidney injury (CMS/HCC)      Plan:   -trend troponin and EKG in AM  -continue IVF, encourage PO hydration as well  -BMP in AM, Urine lytes, UrCr   -pending COVID-19 swab, patient declining nasal swab at this time, continue precautions until ruled out.   -tobacco cessation counseling >3mins      DVT prophylaxis:  Medical DVT prophylaxis orders are present.    CODE STATUS:    Level Of Support Discussed With: Patient  Code Status: CPR  Medical Interventions (Level of Support Prior to Arrest): Full    Admission Status:  I believe this patient meets observation status.    Electronically signed by Ronit Liriano MD, 06/12/21, 10:06 PM EDT.

## 2021-06-13 NOTE — NURSING NOTE
Case management notified of [facility] a [facility].

## 2021-06-14 ENCOUNTER — READMISSION MANAGEMENT (OUTPATIENT)
Dept: CALL CENTER | Facility: HOSPITAL | Age: 51
End: 2021-06-14

## 2021-06-14 LAB — QT INTERVAL: 449 MS

## 2021-06-14 NOTE — OUTREACH NOTE
Prep Survey      Responses   [facility] facility patient discharged from?  [place]   Is LACE score < 7 ?  Yes   Emergency Room discharge w/ pulse ox?  No   Eligibility  Not Eligible   What are the reasons patient is not eligible?  Other [[facility]   Does the patient have one of the following disease processes/diagnoses(primary or secondary)?  Other   Prep survey completed?  Yes          Erma Deluca RN

## 2023-04-26 NOTE — PROGRESS NOTES
Pt refusing 4 eyes skin assessment. Pt states no skin issues or open areas. Detail Level: Detailed Photo Preface (Leave Blank If You Do Not Want): Photographs were obtained today

## 2023-08-08 NOTE — PROGRESS NOTES
Bedside report and pt care transferred to [facility]. Marko Garcia RN  \ English

## 2023-10-29 NOTE — CASE MANAGEMENT/SOCIAL WORK
"Discharge Planning Assessment   Kristin Appiah     Patient Name: Devonte Ambrosio  MRN: [id number]  Today's Date: 6/13/2021    Admit Date: 6/12/2021    Discharge Needs Assessment     Row Name 06/13/21 1038       Living Environment    Lives With  other (see comments) Homeless    Unique Family Situation  Pt states he was walking in [place] to [employer] to a friends house    Current Living Arrangements  homeless    Primary Care Provided by  self    Provides Primary Care For  no one    Caregiving Concerns  pt states \"all I need is a ride to [employer]\"    Family Caregiver if Needed  none    Quality of Family Relationships  unable to assess    Able to Return to Prior Arrangements  yes    Living Arrangement Comments  Pt states he is currently homeless and is trying to get to [employer] to stay with a friend named Adal       Resource/Environmental Concerns    Resource/Environmental Concerns  reliable transportation    Transportation Concerns  car, none;public transportation, none available       Transition Planning    Patient/Family Anticipates Transition to  other (see comments) to a friend's house    Patient/Family Anticipated Services at Transition         Discharge Needs Assessment    Readmission Within the Last 30 Days  no previous admission in last 30 days    Current Outpatient/Agency/Support Group  -- none    Equipment Currently Used at Home  none    Concerns to be Addressed  homelessness    Concerns Comments  pt states \"he needs a ride to [employer] to stay with a friend\"    Anticipated Changes Related to Illness  none    Equipment Needed After Discharge  none    Outpatient/Agency/Support Group Needs  -- none    Discharge Facility/Level of Care Needs  -- none    Provided Post Acute Provider List?  Refused    Refused Provider List Comment  Offered community resources but patient declines the need for them at this time.    Patient's Choice of Community Agency(s)  none    Current Discharge Risk  " "substance use/abuse    Discharge Coordination/Progress  Pt states he will be staying at a friend's house in [facility] all he needs is a ride.        Discharge Plan     Row Name 06/13/21 1043       Plan    Plan  Friend's house in [facility]    Patient/Family in Agreement with Plan  yes    Plan Comments  Into room and introduced self and role of CM. Discussed discharge disposition with patient at bedside. Patient is currently laying in bed with back to CM and complains of needing a ride to get to a friend's house. Patient confirms that the info on his face sheet is correct except for his address and phone number which CM did change in demographics. He states he does not have a PCP and declines information regarding the [facility]. He also states that he does not have a living will and declines information regarding one. Patient states he is currently homeless and is trying to get a ride to [facility] to stay with a friend Adal. He states that he is independent with his ADL's, does not drive and does not have a vehicle. He also states that he does not currently use any DME and does not anticipate needing any at discharge. Patient states he has not used home health stating \"why would I need them when I don't have a home\". CM offered community resources but patient states \"all I need is a ride out of here, you need to get on the phone and call Medicaid to get me a ride\". CM informed patient that I would try to him transportation through [employer] that services Medicaid patients, but could not make any promises. Patient states if he can get a ride he plans on going to his friend Adal's house at discharge. Patient had no other questions or concerns regarding discharge plans. CM did call [employer] @ [phone] and got a recording that states \"they provide services Monday through Friday from 8:00am to 4:30pm and on Saturday from 8:00am until 1:00pm and they do not " schedule appointmentss on Sunday and to call back during regular business hours. CM will discuss this with patient. Name and number placed on white board in room. CM will continue to follow for needs.        Continued Care and Services - Admitted Since 6/12/2021    Coordination has not been started for this encounter.         Demographic Summary     Row Name 06/13/21 1037       General Information    Admission Type  observation    Arrived From  other (see comments) pt is homeless and states he was in [place] walking to a friends house in [facility]    Referral Source  admission list    Reason for Consult  discharge planning    Preferred Language  English     Used During This Interaction  no       Contact Information    Permission Granted to Share Info With          Functional Status    No documentation.       Psychosocial    No documentation.       Abuse/Neglect    No documentation.       Legal    No documentation.       Substance Abuse    No documentation.       Patient Forms    No documentation.           Violet Nguyen RN     [place]

## 2025-01-30 NOTE — PAYOR COMM NOTE
"Devonte Terrazas (50 y.o. Male)     ATTN: NURSE REVIEWER  RE: OBSERVATION ADMIT - AUTH REQUEST FOR PROCEDURES  REF#[id number]  PLS REPLY TO TIFFANI MCKEON [phone] FAX# [phone]      Date of Birth Social Security Number Address Home Phone MRN    1970  [id number]  [place] [address] [phone] [id number]    [employer] Status          None Single       Admission Date Admission Type Admitting Provider Attending Provider Department, Room/Bed    21 Emergency Ronit Hopson MD  [facility] MED SURG, 1404/1    Discharge Date Discharge Disposition Discharge Destination        2021 Home or Self Care              Attending Provider: (none)   Allergies: Penicillins    Isolation: None   Infection: COVID Screen (preop/placement) (21)   Code Status: Prior    Ht: 172.7 cm (68\")   Wt: 97.5 kg (215 lb)    Admission Cmt: None   Principal Problem: None                Active Insurance as of 2021     Primary Coverage     Payor Plan Insurance Group Employer/Plan Group    [employer] [place] [employer] [place]      Payor Plan Address Payor Plan Phone Number Payor Plan Fax Number Effective Dates    [address] [address] [phone]  2021 - None Entered    [place] [address]       Subscriber Name Subscriber Birth Date Member ID       DEVONTE TERRAZAS 1970 [id number]                 Emergency Contacts          No emergency contacts on file.               History & Physical      Ronit Hopson MD at 21 [address] [place] [address]   HISTORY AND PHYSICAL    Patient Name: Devonte Terrazas  : 1970  MRN: [id number]  Primary Care Physician:  Provider, No Known  Date of admission: 2021    Subjective   Subjective     Chief Complaint: chest pain    49 y/o M with hx of CVA with no residual weakness, MI, HTN, amphetamine abuse and GERD presented to ED this evening with complaints of chest pain. Pain was localized on left mid-axillary " area, 10/10, pressure-like, non-radiating, accompanied by palpitations.   Per patient he also felt like he was having a panic attack but was not able to differentiate if the pain was due to that or MI since he had one before. Patient says he usually gets panic attacks when he consumes meth. He admits to consuming meth and marijuana earlier today, claims he relapsed after 21 months clean.       Review of Systems   Constitutional: Negative.    HENT: Negative.    Eyes: Negative.    Respiratory: Negative.    Cardiovascular: Negative.    Gastrointestinal: Negative.    Endocrine: Negative.    Genitourinary: Negative.    Musculoskeletal: Negative.    Skin: Negative.    Allergic/Immunologic: Negative.    Neurological: Negative.    Hematological: Negative.    Psychiatric/Behavioral: Negative.    All other systems reviewed and are negative.       Personal History     Past Medical History:   Diagnosis Date   • Acute liver failure    • Anxiety    • Auditory hallucination    • Cocaine abuse (CMS/HCC)    • Depression    • GERD (gastroesophageal reflux disease)    • Hepatitis B    • Hypertension    • Methamphetamine addiction (CMS/HCC)    • Myocardial infarction (CMS/HCC)    • Stroke (CMS/HCC)    • Suicidal ideation        History reviewed. No pertinent surgical history.    Family History: family history is not on file. Otherwise pertinent FHx was reviewed and not pertinent to current issue.    Social History:  reports that he has been smoking. He has been smoking about 0.50 packs per day. His smokeless tobacco use includes chew. He reports previous alcohol use. He reports current drug use. Drugs: Methamphetamines and Marijuana.    Home Medications:  aspirin, lisinopril, and omeprazole    Allergies:  No Known Allergies    Objective    Objective     Vitals:   Temp:  [97.5 °F (36.4 °C)-98.7 °F (37.1 °C)] 97.5 °F (36.4 °C)  Heart Rate:  [] 86  Resp:  [16-18] 18  BP: (101-122)/(66-95) 108/66    Physical Exam  Vitals and nursing  note reviewed.   Constitutional:       General: He is not in acute distress.     Appearance: Normal appearance. He is not ill-appearing or toxic-appearing.   HENT:      Head: Normocephalic and atraumatic.      Right Ear: External ear normal.      Left Ear: External ear normal.      Nose: Nose normal. No congestion or rhinorrhea.      Mouth/Throat:      Mouth: Mucous membranes are moist.      Pharynx: No oropharyngeal exudate or posterior oropharyngeal erythema.   Eyes:      Extraocular Movements: Extraocular movements intact.      Conjunctiva/sclera: Conjunctivae normal.      Pupils: Pupils are equal, round, and reactive to light.   Cardiovascular:      Rate and Rhythm: Regular rhythm. Tachycardia present.      Pulses: Normal pulses.      Heart sounds: Normal heart sounds. No murmur heard.   No friction rub. No gallop.    Pulmonary:      Effort: Pulmonary effort is normal. No respiratory distress.      Breath sounds: Normal breath sounds. No stridor. No wheezing, rhonchi or rales.   Chest:      Chest wall: No tenderness.   Abdominal:      General: There is no distension.      Palpations: Abdomen is soft.      Tenderness: There is no abdominal tenderness.   Musculoskeletal:         General: No swelling or tenderness. Normal range of motion.      Cervical back: Normal range of motion and neck supple.      Right lower leg: No edema.      Left lower leg: No edema.   Skin:     General: Skin is warm.   Neurological:      General: No focal deficit present.      Mental Status: He is alert and oriented to person, place, and time.         Result Review    Result Review:  I have personally reviewed the results from the time of this admission to 6/12/2021 23:11 EDT and agree with these findings:  [x]  Laboratory  []  Microbiology  []  Radiology  [x]  EKG/Telemetry   []  Cardiology/Vascular   []  Pathology  [x]  Old records  []  Other:  Most notable findings include: Positive drug screen (THC, amphetamines), elevated SrCr 2.40  when compared to 6-7 weeks prior 1.4    Assessment/Plan   Assessment / Plan     Brief Patient Summary:  Devonte Ambrosio is a 50 y.o. male who presented to ED w/ complaints of chest pain + panic attack, found to have an HERNAN. Admitted for observation due to prior hx of CAD/MI/CVA.   Active Hospital Problems:  Active Hospital Problems    Diagnosis    • Acute kidney injury (CMS/HCC)      Plan:   -trend troponin and EKG in AM  -continue IVF, encourage PO hydration as well  -BMP in AM, Urine lytes, UrCr   -pending COVID-19 swab, patient declining nasal swab at this time, continue precautions until ruled out.   -tobacco cessation counseling >3mins      DVT prophylaxis:  Medical DVT prophylaxis orders are present.    CODE STATUS:    Level Of Support Discussed With: Patient  Code Status: CPR  Medical Interventions (Level of Support Prior to Arrest): Full    Admission Status:  I believe this patient meets observation status.    Electronically signed by Ronit Liriano MD, 06/12/21, 10:06 PM EDT.    Electronically signed by Ronit Hopson MD at 06/12/21 2315       Operative/Procedure Notes (last 24 hours) (Notes from 06/13/21 1505 through 06/14/21 1505)    No notes of this type exist for this encounter.         Physician Progress Notes (last 24 hours) (Notes from 06/13/21 1505 through 06/14/21 1505)    No notes of this type exist for this encounter.         Consult Notes (last 24 hours) (Notes from 06/13/21 1505 through 06/14/21 1505)    No notes of this type exist for this encounter.            Discharge Summary      Jak Spence MD at 06/13/21 1129          Devonte Ambrosio  1970  [id number]    Hospitalists Discharge Summary    Date of Admission: 6/12/2021  Date of Discharge:  6/13/2021    Primary Discharge Diagnoses:  1.  HERNAN  2.  Substance abuse  3.  Chest Pain  4.  Obesity Body mass index is 32.69 kg/m².    History of Present Illness (taken from H&P):  49 y/o M with hx of CVA with no  residual weakness, MI, HTN, amphetamine abuse and GERD presented to ED this evening with complaints of chest pain. Pain was localized on left mid-axillary area, 10/10, pressure-like, non-radiating, accompanied by palpitations.   Per patient he also felt like he was having a panic attack but was not able to differentiate if the pain was due to that or MI since he had one before. Patient says he usually gets panic attacks when he consumes meth. He admits to consuming meth and marijuana earlier today, claims he relapsed after 21 months clean.     Hospital Course:  Mr. Ambrosio was admitted to the Med/Surg unit and continued on IVF.  Creatinine was better than what was reported from discharge in April.  I will continue to hold his ACEI, however, until he has been seen at follow-up.  Troponin were negative x2.  No dynamic EKG changes noted on serial exams.    PCP  Patient Care Team:  Provider, No Known as PCP - General    Consults:   Consults     No orders found for last 30 day(s).          Operations and Procedures Performed:       XR Chest 2 View    Result Date: 6/12/2021  Narrative: CR Chest 2 Vws INDICATION:  Chest pain COMPARISON:  None. FINDINGS: PA and lateral views of the chest.  Heart and mediastinal contours are normal. The lungs are clear apart from calcified granuloma and mediastinal nodes No pneumothorax or pleural effusion.      Impression: No acute cardiopulmonary findings. Signer Name: Christina Pereyra MD  Signed: 6/12/2021 8:57 PM  Workstation Name: [id number]  [facility]      Allergies:  is allergic to penicillins.    Jett  reviewed    Discharge Medications:     Discharge Medications      Continue These Medications      Instructions Start Date   aspirin 81 MG chewable tablet   81 mg, Oral, Daily      omeprazole 20 MG capsule  Commonly known as: priLOSEC   20 mg, Oral, Daily         Stop These Medications    lisinopril 20 MG tablet  Commonly known as: PRINIVIL,ZESTRIL            Last  Lab Results:   Lab Results (most recent)     Procedure Component Value Units Date/Time    Basic Metabolic Panel [[id number]]  (Abnormal) Collected: 06/13/21 0742    Specimen: Blood Updated: 06/13/21 0832     Glucose 97 mg/dL      BUN 26 mg/dL      Creatinine 1.34 mg/dL      Sodium 138 mmol/L      Potassium 3.8 mmol/L      Chloride 103 mmol/L      CO2 22.9 mmol/L      Calcium 9.1 mg/dL      eGFR Non African Amer 56 mL/min/1.73      BUN/Creatinine Ratio 19.4     Anion Gap 12.1 mmol/L     Narrative:      GFR Normal >60  Chronic Kidney Disease <60  Kidney Failure <15      Troponin [[id number]]  (Normal) Collected: 06/13/21 0742    Specimen: Blood Updated: 06/13/21 0827     Troponin T <0.010 ng/mL     Narrative:      Troponin T Reference Range:  <= 0.03 ng/mL-   Negative for AMI  >0.03 ng/mL-     Abnormal for myocardial necrosis.  Clinicians would have to utilize clinical acumen, EKG, Troponin and serial changes to determine if it is an Acute Myocardial Infarction or myocardial injury due to an underlying chronic condition.       Results may be falsely decreased if patient taking Biotin.      CBC (No Diff) [[id number]]  (Normal) Collected: 06/13/21 0742    Specimen: Blood Updated: 06/13/21 0815     WBC 10.39 10*3/mm3      RBC 4.78 10*6/mm3      Hemoglobin 15.1 g/dL      Hematocrit 46.1 %      MCV 96.4 fL      MCH 31.6 pg      MCHC 32.8 g/dL      RDW 13.7 %      RDW-SD 49.4 fl      MPV 9.0 fL      Platelets 397 10*3/mm3     Sodium, Urine, Random - Urine, Clean Catch [[id number]] Collected: 06/12/21 2341    Specimen: Urine, Clean Catch Updated: 06/13/21 0024     Sodium, Urine 114 mmol/L     Narrative:      Reference intervals for random urine have not been established.  Clinical usage is dependent upon physician's interpretation in combination with other laboratory tests.       Creatinine, Urine, Random - Urine, Clean Catch [[id number]] Collected: 06/12/21 2341    Specimen: Urine, Clean Catch Updated: 06/13/21 0011    Magnesium  [[id number]]  (Normal) Collected: 06/12/21 2033    Specimen: Blood Updated: 06/12/21 2321     Magnesium 2.4 mg/dL     Phosphorus [[id number]]  (Normal) Collected: 06/12/21 2033    Specimen: Blood Updated: 06/12/21 2321     Phosphorus 3.5 mg/dL     Urine Drug Screen - Urine, Clean Catch [[id number]]  (Abnormal) Collected: 06/12/21 2118    Specimen: Urine, Clean Catch Updated: 06/12/21 2137     THC, Screen, Urine Positive     Phencyclidine (PCP), Urine Negative     Cocaine Screen, Urine Negative     Methamphetamine, Ur Positive     Opiate Screen Negative     Amphetamine Screen, Urine Positive     Benzodiazepine Screen, Urine Negative     Tricyclic Antidepressants Screen Negative     Methadone Screen, Urine Negative     Barbiturates Screen, Urine Negative     Oxycodone Screen, Urine Negative     Propoxyphene Screen Negative     Buprenorphine, Screen, Urine Negative    Narrative:      Urine drug screen results are to be used for medical purposes only.  They are not to be used for legal purposes such as employment testing.  Negative results do not necessarily mean the complete absence of a subtance, but rather that the result is less than the cutoff for that substance.  Positive results are unconfirmed and considered Preliminary Positive.  [facility] does not automatically confirm Postitive Unconfirmed results.  The physician may request (order) an Unconfirmed Positive result to be sent out for confirmation.      Negative Thresholds for Drugs Screened:    THC screen, urine                          50 ng/ml  Phenycyclidine (PCP), urine                25 ng/ml  Cocaine screen, urine                     150 ng/ml  Methamphetamine, urine                    500 ng/ml  Opiate screen, urine                      100 ng/ml  Amphetamine screen, urine                 500 ng/ml  Benzodiazepine screen, urine              150 ng/ml  Tricyclic Antidepressants screen, urine   300 ng/ml  Methadone screen, urine                    200 ng/ml  Barbiturates screen, urine                200 ng/ml  Oxycodone screen, urine                   100 ng/ml  Propoxyphene screen, urine                300 ng/ml  Buprenorphine screen, urine                10 ng/ml    BNP [[id number]]  (Abnormal) Collected: 06/12/21 2033    Specimen: Blood Updated: 06/12/21 2106     proBNP 930.5 pg/mL     Narrative:      Among patients with dyspnea, NT-proBNP is highly sensitive for the detection of acute congestive heart failure. In addition NT-proBNP of <300 pg/ml effectively rules out acute congestive heart failure with 99% negative predictive value.    Results may be falsely decreased if patient taking Biotin.      Troponin [[id number]]  (Normal) Collected: 06/12/21 2033    Specimen: Blood Updated: 06/12/21 2101     Troponin T <0.010 ng/mL     Narrative:      Troponin T Reference Range:  <= 0.03 ng/mL-   Negative for AMI  >0.03 ng/mL-     Abnormal for myocardial necrosis.  Clinicians would have to utilize clinical acumen, EKG, Troponin and serial changes to determine if it is an Acute Myocardial Infarction or myocardial injury due to an underlying chronic condition.       Results may be falsely decreased if patient taking Biotin.      Comprehensive Metabolic Panel [[id number]]  (Abnormal) Collected: 06/12/21 2033    Specimen: Blood Updated: 06/12/21 2057     Glucose 122 mg/dL      BUN 26 mg/dL      Creatinine 2.40 mg/dL      Sodium 140 mmol/L      Potassium 3.4 mmol/L      Chloride 100 mmol/L      CO2 22.0 mmol/L      Calcium 10.1 mg/dL      Total Protein 8.5 g/dL      Albumin 5.00 g/dL      ALT (SGPT) 17 U/L      AST (SGOT) 21 U/L      Alkaline Phosphatase 74 U/L      Total Bilirubin 0.8 mg/dL      eGFR Non African Amer 29 mL/min/1.73      Globulin 3.5 gm/dL      A/G Ratio 1.4 g/dL      BUN/Creatinine Ratio 10.8     Anion Gap 18.0 mmol/L     Narrative:      GFR Normal >60  Chronic Kidney Disease <60  Kidney Failure <15      Ethanol [[id number]] Collected: 06/12/21 2033     Specimen: Blood Updated: 06/12/21 2057     Ethanol <10 mg/dL      Ethanol % <0.010 %     CBC & Differential [[id number]]  (Abnormal) Collected: 06/12/21 2033    Specimen: Blood Updated: 06/12/21 2041    Narrative:      The following orders were created for panel order CBC & Differential.  Procedure                               Abnormality         Status                     ---------                               -----------         ------                     CBC Auto Differential[[id number]]        Abnormal            Final result                 Please view results for these tests on the individual orders.    CBC Auto Differential [[id number]]  (Abnormal) Collected: 06/12/21 2033    Specimen: Blood Updated: 06/12/21 2041     WBC 15.39 10*3/mm3      RBC 5.23 10*6/mm3      Hemoglobin 16.7 g/dL      Hematocrit 48.7 %      MCV 93.1 fL      MCH 31.9 pg      MCHC 34.3 g/dL      RDW 13.9 %      RDW-SD 47.5 fl      MPV 9.1 fL      Platelets 490 10*3/mm3      Neutrophil % 70.6 %      Lymphocyte % 17.7 %      Monocyte % 9.8 %      Eosinophil % 0.9 %      Basophil % 0.7 %      Immature Grans % 0.3 %      Neutrophils, Absolute 10.87 10*3/mm3      Lymphocytes, Absolute 2.72 10*3/mm3      Monocytes, Absolute 1.51 10*3/mm3      Eosinophils, Absolute 0.14 10*3/mm3      Basophils, Absolute 0.11 10*3/mm3      Immature Grans, Absolute 0.04 10*3/mm3      nRBC 0.0 /100 WBC         Imaging Results (Most Recent)     Procedure Component Value Units Date/Time    XR Chest 2 View [[id number]] Collected: 06/12/21 2057     Updated: 06/12/21 2059    Narrative:      CR Chest 2 Vws    INDICATION:    Chest pain    COMPARISON:    None.    FINDINGS:   PA and lateral views of the chest.  Heart and mediastinal contours are normal. The lungs are clear apart from calcified granuloma and mediastinal nodes No pneumothorax or pleural effusion.        Impression:      No acute cardiopulmonary findings.    Signer Name: Christina Pereyra MD   Signed: 6/12/2021 8:57  PM   Workstation Name: [id number]    [facility]          PROCEDURES      Condition on Discharge:  Stable    Physical Exam at Discharge  Vital Signs  Temp:  [96.5 °F (35.8 °C)-98.7 °F (37.1 °C)] 97.4 °F (36.3 °C)  Heart Rate:  [] 64  Resp:  [16-18] 18  BP: (101-122)/(64-95) 105/64    Physical Exam (examined from the door, as he refused COVID-19 swabbing, so I could conserve PPE):  Physical Exam   Constitutional: Patient appears well-developed and well-nourished and in no acute distress   Pulmonary/Chest: No respiratory distress  Abdominal: Obese.  Neurological: Cranial nerves II-XII are grossly intact with no focal deficits.    Discharge Disposition  Home    Visiting Nurse:    No     Home PT/OT:  No     Home Safety Evaluation:  No     DME  None    Discharge Diet:      Dietary Orders (From admission, onward)     Start     Ordered    06/12/21 2309  Diet Regular; Cardiac  Diet Effective Now     Question Answer Comment   Diet Texture / Consistency Regular    Common Modifiers Cardiac        06/12/21 2308                Activity at Discharge:  As tolerated    Follow-up Appointments  No future appointments.  Additional Instructions for the Follow-ups that You Need to Schedule     Discharge Follow-up with PCP   As directed       Currently Documented PCP:    Provider, No Known    PCP Phone Number:    [phone]     Follow Up Details: Needs to establish w/ PCP at hometown within 2 weeks               Test Results Pending at Discharge  Pending Labs     Order Current Status    Creatinine, Urine, Random - Urine, Clean Catch In process           Jak Spence MD  06/13/21  11:31 EDT    Electronically signed by Jak Spence MD at 06/13/21 4171        Yes...